# Patient Record
Sex: FEMALE | Race: WHITE | NOT HISPANIC OR LATINO | URBAN - METROPOLITAN AREA
[De-identification: names, ages, dates, MRNs, and addresses within clinical notes are randomized per-mention and may not be internally consistent; named-entity substitution may affect disease eponyms.]

---

## 2023-11-22 ENCOUNTER — OUTPATIENT (OUTPATIENT)
Dept: OUTPATIENT SERVICES | Facility: HOSPITAL | Age: 83
LOS: 1 days | End: 2023-11-22
Payer: MEDICARE

## 2023-11-22 VITALS
WEIGHT: 184.09 LBS | DIASTOLIC BLOOD PRESSURE: 75 MMHG | TEMPERATURE: 98 F | RESPIRATION RATE: 18 BRPM | OXYGEN SATURATION: 97 % | HEART RATE: 80 BPM | HEIGHT: 64.96 IN | SYSTOLIC BLOOD PRESSURE: 150 MMHG

## 2023-11-22 VITALS
SYSTOLIC BLOOD PRESSURE: 142 MMHG | DIASTOLIC BLOOD PRESSURE: 86 MMHG | RESPIRATION RATE: 19 BRPM | OXYGEN SATURATION: 100 % | HEART RATE: 78 BPM

## 2023-11-22 DIAGNOSIS — D64.9 ANEMIA, UNSPECIFIED: ICD-10-CM

## 2023-11-22 DIAGNOSIS — Z98.890 OTHER SPECIFIED POSTPROCEDURAL STATES: Chronic | ICD-10-CM

## 2023-11-22 PROCEDURE — 88305 TISSUE EXAM BY PATHOLOGIST: CPT | Mod: 26

## 2023-11-22 PROCEDURE — 45380 COLONOSCOPY AND BIOPSY: CPT

## 2023-11-22 PROCEDURE — 88312 SPECIAL STAINS GROUP 1: CPT | Mod: 26

## 2023-11-22 PROCEDURE — 88312 SPECIAL STAINS GROUP 1: CPT

## 2023-11-22 PROCEDURE — 88305 TISSUE EXAM BY PATHOLOGIST: CPT

## 2023-11-22 PROCEDURE — 43239 EGD BIOPSY SINGLE/MULTIPLE: CPT

## 2023-11-22 RX ORDER — SODIUM CHLORIDE 9 MG/ML
500 INJECTION INTRAMUSCULAR; INTRAVENOUS; SUBCUTANEOUS
Refills: 0 | Status: COMPLETED | OUTPATIENT
Start: 2023-11-22 | End: 2023-11-22

## 2023-11-22 RX ADMIN — SODIUM CHLORIDE 30 MILLILITER(S): 9 INJECTION INTRAMUSCULAR; INTRAVENOUS; SUBCUTANEOUS at 09:07

## 2023-11-22 NOTE — ASU DISCHARGE PLAN (ADULT/PEDIATRIC) - NS MD DC FALL RISK RISK
For information on Fall & Injury Prevention, visit: [email or web address] OR  [email or web address] OR  https://www.cdc.gov/steadi/patient.html

## 2023-11-27 LAB
SURGICAL PATHOLOGY STUDY: SIGNIFICANT CHANGE UP
SURGICAL PATHOLOGY STUDY: SIGNIFICANT CHANGE UP

## 2025-02-08 ENCOUNTER — INPATIENT (INPATIENT)
Facility: HOSPITAL | Age: 85
LOS: 3 days | Discharge: HOME | DRG: 840 | End: 2025-02-12
Attending: INTERNAL MEDICINE | Admitting: INTERNAL MEDICINE
Payer: MEDICARE

## 2025-02-08 VITALS
SYSTOLIC BLOOD PRESSURE: 133 MMHG | WEIGHT: 198.42 LBS | HEART RATE: 78 BPM | DIASTOLIC BLOOD PRESSURE: 87 MMHG | OXYGEN SATURATION: 95 % | RESPIRATION RATE: 16 BRPM | HEIGHT: 65 IN | TEMPERATURE: 98 F

## 2025-02-08 DIAGNOSIS — I50.9 HEART FAILURE, UNSPECIFIED: ICD-10-CM

## 2025-02-08 DIAGNOSIS — Z98.890 OTHER SPECIFIED POSTPROCEDURAL STATES: Chronic | ICD-10-CM

## 2025-02-08 PROBLEM — F32.9 MAJOR DEPRESSIVE DISORDER, SINGLE EPISODE, UNSPECIFIED: Chronic | Status: ACTIVE | Noted: 2023-11-22

## 2025-02-08 PROBLEM — E03.9 HYPOTHYROIDISM, UNSPECIFIED: Chronic | Status: ACTIVE | Noted: 2023-11-22

## 2025-02-08 LAB
ALBUMIN SERPL ELPH-MCNC: 2.7 G/DL — LOW (ref 3.5–5)
ALP SERPL-CCNC: 108 U/L — SIGNIFICANT CHANGE UP (ref 40–120)
ALT FLD-CCNC: 11 U/L DA — SIGNIFICANT CHANGE UP (ref 10–60)
ANION GAP SERPL CALC-SCNC: 3 MMOL/L — LOW (ref 5–17)
APPEARANCE UR: CLEAR — SIGNIFICANT CHANGE UP
AST SERPL-CCNC: 11 U/L — SIGNIFICANT CHANGE UP (ref 10–40)
BACTERIA # UR AUTO: ABNORMAL /HPF
BASOPHILS # BLD AUTO: 0.02 K/UL — SIGNIFICANT CHANGE UP (ref 0–0.2)
BASOPHILS NFR BLD AUTO: 0.7 % — SIGNIFICANT CHANGE UP (ref 0–2)
BILIRUB SERPL-MCNC: 0.4 MG/DL — SIGNIFICANT CHANGE UP (ref 0.2–1.2)
BILIRUB UR-MCNC: NEGATIVE — SIGNIFICANT CHANGE UP
BUN SERPL-MCNC: 8 MG/DL — SIGNIFICANT CHANGE UP (ref 7–18)
CALCIUM SERPL-MCNC: 9.2 MG/DL — SIGNIFICANT CHANGE UP (ref 8.4–10.5)
CHLORIDE SERPL-SCNC: 108 MMOL/L — SIGNIFICANT CHANGE UP (ref 96–108)
CO2 SERPL-SCNC: 25 MMOL/L — SIGNIFICANT CHANGE UP (ref 22–31)
COLOR SPEC: YELLOW — SIGNIFICANT CHANGE UP
CREAT SERPL-MCNC: 0.74 MG/DL — SIGNIFICANT CHANGE UP (ref 0.5–1.3)
DIFF PNL FLD: NEGATIVE — SIGNIFICANT CHANGE UP
EGFR: 81 ML/MIN/1.73M2 — SIGNIFICANT CHANGE UP
EOSINOPHIL # BLD AUTO: 0.1 K/UL — SIGNIFICANT CHANGE UP (ref 0–0.5)
EOSINOPHIL NFR BLD AUTO: 3.7 % — SIGNIFICANT CHANGE UP (ref 0–6)
EPI CELLS # UR: PRESENT
FLUAV AG NPH QL: SIGNIFICANT CHANGE UP
FLUBV AG NPH QL: SIGNIFICANT CHANGE UP
GLUCOSE SERPL-MCNC: 135 MG/DL — HIGH (ref 70–99)
GLUCOSE UR QL: NEGATIVE MG/DL — SIGNIFICANT CHANGE UP
HCT VFR BLD CALC: 27.8 % — LOW (ref 34.5–45)
HGB BLD-MCNC: 8 G/DL — LOW (ref 11.5–15.5)
IMM GRANULOCYTES NFR BLD AUTO: 1.1 % — HIGH (ref 0–0.9)
KETONES UR-MCNC: NEGATIVE MG/DL — SIGNIFICANT CHANGE UP
LEUKOCYTE ESTERASE UR-ACNC: ABNORMAL
LYMPHOCYTES # BLD AUTO: 0.64 K/UL — LOW (ref 1–3.3)
LYMPHOCYTES # BLD AUTO: 23.7 % — SIGNIFICANT CHANGE UP (ref 13–44)
MCHC RBC-ENTMCNC: 24.9 PG — LOW (ref 27–34)
MCHC RBC-ENTMCNC: 28.8 G/DL — LOW (ref 32–36)
MCV RBC AUTO: 86.6 FL — SIGNIFICANT CHANGE UP (ref 80–100)
MONOCYTES # BLD AUTO: 0.42 K/UL — SIGNIFICANT CHANGE UP (ref 0–0.9)
MONOCYTES NFR BLD AUTO: 15.6 % — HIGH (ref 2–14)
NEUTROPHILS # BLD AUTO: 1.49 K/UL — LOW (ref 1.8–7.4)
NEUTROPHILS NFR BLD AUTO: 55.2 % — SIGNIFICANT CHANGE UP (ref 43–77)
NITRITE UR-MCNC: NEGATIVE — SIGNIFICANT CHANGE UP
NRBC # BLD: 0 /100 WBCS — SIGNIFICANT CHANGE UP (ref 0–0)
NRBC BLD-RTO: 0 /100 WBCS — SIGNIFICANT CHANGE UP (ref 0–0)
NT-PROBNP SERPL-SCNC: 659 PG/ML — HIGH (ref 0–450)
PH UR: 6.5 — SIGNIFICANT CHANGE UP (ref 5–8)
PLATELET # BLD AUTO: 200 K/UL — SIGNIFICANT CHANGE UP (ref 150–400)
POTASSIUM SERPL-MCNC: 3.7 MMOL/L — SIGNIFICANT CHANGE UP (ref 3.5–5.3)
POTASSIUM SERPL-SCNC: 3.7 MMOL/L — SIGNIFICANT CHANGE UP (ref 3.5–5.3)
PROT SERPL-MCNC: 8.4 G/DL — HIGH (ref 6–8.3)
PROT UR-MCNC: NEGATIVE MG/DL — SIGNIFICANT CHANGE UP
RBC # BLD: 3.21 M/UL — LOW (ref 3.8–5.2)
RBC # FLD: 19.1 % — HIGH (ref 10.3–14.5)
RBC CASTS # UR COMP ASSIST: 1 /HPF — SIGNIFICANT CHANGE UP (ref 0–4)
RSV RNA NPH QL NAA+NON-PROBE: SIGNIFICANT CHANGE UP
SARS-COV-2 RNA SPEC QL NAA+PROBE: SIGNIFICANT CHANGE UP
SODIUM SERPL-SCNC: 136 MMOL/L — SIGNIFICANT CHANGE UP (ref 135–145)
SP GR SPEC: 1.01 — SIGNIFICANT CHANGE UP (ref 1–1.03)
TROPONIN I, HIGH SENSITIVITY RESULT: 9.3 NG/L — SIGNIFICANT CHANGE UP
UROBILINOGEN FLD QL: 0.2 MG/DL — SIGNIFICANT CHANGE UP (ref 0.2–1)
WBC # BLD: 2.7 K/UL — LOW (ref 3.8–10.5)
WBC # FLD AUTO: 2.7 K/UL — LOW (ref 3.8–10.5)
WBC UR QL: 1 /HPF — SIGNIFICANT CHANGE UP (ref 0–5)

## 2025-02-08 PROCEDURE — 71045 X-RAY EXAM CHEST 1 VIEW: CPT | Mod: 26

## 2025-02-08 PROCEDURE — 71250 CT THORAX DX C-: CPT | Mod: 26

## 2025-02-08 PROCEDURE — 74176 CT ABD & PELVIS W/O CONTRAST: CPT | Mod: 26

## 2025-02-08 PROCEDURE — 99285 EMERGENCY DEPT VISIT HI MDM: CPT

## 2025-02-08 RX ORDER — POLYETHYLENE GLYCOL 3350 17 G/17G
17 POWDER, FOR SOLUTION ORAL DAILY
Refills: 0 | Status: DISCONTINUED | OUTPATIENT
Start: 2025-02-08 | End: 2025-02-12

## 2025-02-08 RX ORDER — MORPHINE SULFATE 60 MG/1
4 TABLET, FILM COATED, EXTENDED RELEASE ORAL EVERY 4 HOURS
Refills: 0 | Status: DISCONTINUED | OUTPATIENT
Start: 2025-02-08 | End: 2025-02-10

## 2025-02-08 RX ORDER — MORPHINE SULFATE 60 MG/1
2 TABLET, FILM COATED, EXTENDED RELEASE ORAL EVERY 4 HOURS
Refills: 0 | Status: DISCONTINUED | OUTPATIENT
Start: 2025-02-08 | End: 2025-02-10

## 2025-02-08 RX ORDER — LIDOCAINE HYDROCHLORIDE 30 MG/G
1 CREAM TOPICAL ONCE
Refills: 0 | Status: COMPLETED | OUTPATIENT
Start: 2025-02-08 | End: 2025-02-08

## 2025-02-08 RX ORDER — NALOXONE HYDROCHLORIDE 3 MG/.1ML
0.4 SPRAY NASAL ONCE
Refills: 0 | Status: DISCONTINUED | OUTPATIENT
Start: 2025-02-08 | End: 2025-02-12

## 2025-02-08 RX ORDER — MORPHINE SULFATE 60 MG/1
4 TABLET, FILM COATED, EXTENDED RELEASE ORAL ONCE
Refills: 0 | Status: DISCONTINUED | OUTPATIENT
Start: 2025-02-08 | End: 2025-02-08

## 2025-02-08 RX ORDER — ACETAMINOPHEN 160 MG/5ML
650 SUSPENSION ORAL EVERY 6 HOURS
Refills: 0 | Status: DISCONTINUED | OUTPATIENT
Start: 2025-02-08 | End: 2025-02-12

## 2025-02-08 RX ORDER — BISACODYL 5 MG
5 TABLET, DELAYED RELEASE (ENTERIC COATED) ORAL DAILY
Refills: 0 | Status: DISCONTINUED | OUTPATIENT
Start: 2025-02-08 | End: 2025-02-12

## 2025-02-08 RX ORDER — SENNOSIDES 8.6 MG
2 TABLET ORAL AT BEDTIME
Refills: 0 | Status: DISCONTINUED | OUTPATIENT
Start: 2025-02-08 | End: 2025-02-12

## 2025-02-08 RX ADMIN — MORPHINE SULFATE 4 MILLIGRAM(S): 60 TABLET, FILM COATED, EXTENDED RELEASE ORAL at 23:20

## 2025-02-08 RX ADMIN — Medication 20 MILLIGRAM(S): at 18:49

## 2025-02-08 RX ADMIN — LIDOCAINE HYDROCHLORIDE 1 PATCH: 30 CREAM TOPICAL at 18:13

## 2025-02-08 RX ADMIN — MORPHINE SULFATE 4 MILLIGRAM(S): 60 TABLET, FILM COATED, EXTENDED RELEASE ORAL at 18:12

## 2025-02-08 NOTE — ED ADULT NURSE NOTE - OBJECTIVE STATEMENT
Patient reports falling in the kitchen while trying to  something from floor. felt dizzy and fell. Patient denies LOC, c/o right sided arm, side of breast, leg pain B/L lower ext swelling noted. Fall bundle activated and delegated to PCA david. Patient and daughter educated on fall prevention and related risks of injuries. patient and daughter verbalized understanding.

## 2025-02-08 NOTE — H&P ADULT - ATTENDING COMMENTS
T(C): 37.1 (02-08-25 @ 23:30), Max: 37.1 (02-08-25 @ 23:30)  HR: 73 (02-08-25 @ 23:30) (73 - 78)  BP: 122/60 (02-08-25 @ 23:30) (122/60 - 133/87)  RR: 17 (02-08-25 @ 23:30) (16 - 17)  SpO2: 95% (02-08-25 @ 23:30) (95% - 95%)    CT chest Abdomen shows lytic lesions involving the ribs, visualized spine, pelvic   bones, and proximal femurs, consistent with history of multiple myeloma.   Multiple subacute healing right rib fractures  Syncope   s/p Fall     Tele, CE   Follow up Echo   Cards eval   CT head     Multple Myeloma   Pain meds     Palliative Care    Pain meds   SW

## 2025-02-08 NOTE — ED ADULT TRIAGE NOTE - NS ED NURSE AMBULANCES
Called pt regarding below message. Informed pt that the [facility] does have a larger MRI machine. PT states [place] MRI department is being remodeled and the machine they are using is far too small. Pt states he will call back to schedule MRI in [facility]. Pt verbalized understanding with no further questions.     ----- Message from Marylu Kern sent at 8/14/2018  4:12 PM CDT -----    Patient   Is calling  About   The    MRI  That  Was  Booked the  Pt   This  Morning // pt  Reporting  The  MRI was  Too  Small   And  Pt  Was  Not able to take  Test // please call pt  At [phone]   Pt    Wants to discuss   Using  A another    MRI machine  For  His  Size //     
Called pt regarding below message. Left voicemail with return number.     ----- Message from Margo Gambino sent at 8/14/2018  7:28 AM CDT -----  Type: Needs Medical Advice    Who Called:  Patient  Best Call Back Number: [phone]  Additional Information: Patient went to have MRI done this morning at [facility]/machine was too small and told to request MRI be scheduled at [facility] instead/please call patient back to advise.    
[facility]

## 2025-02-08 NOTE — H&P ADULT - NSICDXPASTMEDICALHX_GEN_ALL_CORE_FT
PAST MEDICAL HISTORY:  Depression, reactive     HTN (hypertension)     Hypothyroid     Multiple myeloma

## 2025-02-08 NOTE — H&P ADULT - PROBLEM SELECTOR PLAN 1
Presenting after syncopal episode and with complaints of worsening bilateral lower extremity swelling.  trop neg.  - likely vasovagal iso intractable skeletal pain 2/2 progression of multiple myeloma. However, will need to r/o orthostatics and cardiac etiology. Low suspicion for seizures.  - EKG.  - TTE.  - PT eval.

## 2025-02-08 NOTE — H&P ADULT - PROBLEM SELECTOR PLAN 2
Follows with Heme onc Dr Colon ([facility]) outpatient. Completed course of Ninlaro ~6 wks ago. Currently on Revlimid.   CT C/A/P - expansile lytic lesions involving ribs, spine pelvic bones, proximal femurs. Multiple subacute healing R rib fractures. No acute pathologic fracture. Hypodensities in the left hepatic lobe and spleen. 1.2 cm RLL pulmonary nodule.     - concern for progression of multiple myeloma.   - pain control.  - Heme onc Dr Colon consult in the AM.   - Palliative consult in the AM.  - family would like more assistance at home given overall functional decline. Will obtain SW/CM consult. Follows with Heme onc Dr Colon ([facility]) outpatient. Completed course of Ninlaro ~6 wks ago. Currently on Revlimid.   CT C/A/P - expansile lytic lesions involving ribs, spine pelvic bones, proximal femurs. Multiple subacute healing R rib fractures. No acute pathologic fracture. Hypodensities in the left hepatic lobe and spleen. 1.2 cm RLL pulmonary nodule.   home meds: Completed course of Ninlaro ~6 wks ago. Currently on Revlimid and prednisone 20mg daily.    - c/w prednisone 20mg daily.  - concern for progression of multiple myeloma.   - pain control.  - Heme onc Dr Colon consult in the AM.   - Palliative consult in the AM.  - family would like more assistance at home given overall functional decline. Will obtain SW/CM consult. Follows with Heme onc Dr Colon ([facility]) outpatient. Completed course of Ninlaro ~6 wks ago. Currently on Revlimid.   CT C/A/P - expansile lytic lesions involving ribs, spine pelvic bones, proximal femurs. Multiple subacute healing R rib fractures. No acute pathologic fracture. Hypodensities in the left hepatic lobe and spleen. 1.2 cm RLL pulmonary nodule.   home meds: Completed course of Ninlaro ~6 wks ago. Currently on Revlimid and prednisone 20mg daily.    - c/w prednisone 20mg daily.  - concern for progression of multiple myeloma.   - pain control.  - Heme onc Dr Colon consulted.   - Palliative consult in the AM.  - family would like more assistance at home given overall functional decline. Will obtain SW/CM consult.

## 2025-02-08 NOTE — ED PROVIDER NOTE - CLINICAL SUMMARY MEDICAL DECISION MAKING FREE TEXT BOX
84-year-old female history of multiple myeloma, hypertension presents with her daughter for fall today.  Did not lose consciousness hit her head.  This is in the setting of worsening lower extremity edema and shortness of breath over the past month.  Has been having chronic bilateral axillary rib pain as well. Patient also recently noted to be neutropenic.  Denies any fevers, chills, substernal chest pain, nausea, vomiting.  Constipated no flatus or stool over the past 3 days despite bowel regimen.  No urinary symptoms.  No prolonged downtime only on the floor for about 5 minutes per daughter.  Arrives with nonactionable vital signs, clear cardiopulmonary exam, no midline spinal tenderness but bilateral rib tenderness and abdominal tenderness with distention.  Lower extremities bilaterally edematous but symmetrical and nontender.  Screen for worsening renal function, heart failure, ACS, rib fractures, obstruction. to be admitted.

## 2025-02-08 NOTE — H&P ADULT - NSHPREVIEWOFSYSTEMS_GEN_ALL_CORE
CONSTITUTIONAL: No fever, weight loss, or fatigue  RESPIRATORY: No cough, wheezing, chills, or hemoptysis; No shortness of breath  CARDIOVASCULAR: +leg swelling. No chest pain, palpitations, dizziness  GASTROINTESTINAL: No abdominal pain. No nausea, vomiting, or hematemesis. No diarrhea or constipation. No melena or hematochezia.  GENITOURINARY: No dysuria or hematuria, urinary frequency  NEUROLOGICAL: No headaches, memory loss, loss of strength, numbness, or tremors  ENDOCRINE: No polyuria, polydipsia, or heat/cold intolerance  MUSKULOSKELETAL: +bilateral rib pain.  HEME: no easy bruisability, no tender or enlarged lymph nodes  SKIN: No itching, burning, rashes, or lesions.

## 2025-02-08 NOTE — ED PROVIDER NOTE - NS ED ROS FT
Constitutional: no fevers, chills  HEENT: no HA, vision changes, rhinorrhea, sore throat  Cardiac: no chest pain, palpitations  Respiratory: +SOB, no cough or hemoptysis  GI: no n/v/d no abd pain, bloody or dark stools  : no dysuria, frequency, or hematuria  MSK: no joint pain, neck pain or back pain  Skin: no rashes, jaundice, pruritis  Neuro: no numbness/tingling, +gen weakness, unsteady gait  ROS otherwise neg except per MDM

## 2025-02-08 NOTE — H&P ADULT - HISTORY OF PRESENT ILLNESS
84F, from home, ambulates minimally with walker, PMH multiple myeloma, HTN, hypothyroidism. Presenting after syncopal episode and with complaints of worsening bilateral lower extremity swelling. Collateral obtained from daughter at bedside. Episode of syncope was unwitnessed. Patient endorses had bilateral rib pain prior to "passing out"; found herself on the floor. States she does not remember everything that happened prior to episode. Denies urinary or fecal incontinence, or tongue biting during episode. Managed to crawl up and call daughter who subsequently sent her to the ED. States has had worsening bilateral rib pain in the past couple of months. Taking tramadol PRN with minimal relief. Denies fever, chills, chest pain, palpitations, SOB, n/v/d, dysuria, numbness ro tingling of ext.     Follows with Heme onc Dr Colon ([facility]) outpatient. Completed course of Ninlaro ~6 wks ago. Currently on Revlimid.     As per daughter, patient's overall functional status has declined in the past 3 months - mostly bedbound, minimally ambulatory. States needs assistance at home.

## 2025-02-08 NOTE — H&P ADULT - NSHPPHYSICALEXAM_GEN_ALL_CORE
Vital Signs Last 24 Hrs  T(C): 37.1 (08 Feb 2025 23:30), Max: 37.1 (08 Feb 2025 23:30)  T(F): 98.8 (08 Feb 2025 23:30), Max: 98.8 (08 Feb 2025 23:30)  HR: 73 (08 Feb 2025 23:30) (73 - 78)  BP: 122/60 (08 Feb 2025 23:30) (122/60 - 133/87)  BP(mean): --  RR: 17 (08 Feb 2025 23:30) (16 - 17)  SpO2: 95% (08 Feb 2025 23:30) (95% - 95%)    Parameters below as of 08 Feb 2025 23:30  Patient On (Oxygen Delivery Method): room air    GENERAL: NAD  HEAD:  Atraumatic, Normocephalic  EYES: EOMI, PERRLA, conjunctiva and sclera clear  ENMT: No tonsillar erythema, exudates, or enlargement; Moist mucous membranes  NECK: Supple, normal appearance, No JVD; Normal thyroid; Trachea midline  NERVOUS SYSTEM:  Alert & Oriented X3,  Motor Strength 4/5 B/L upper and lower extremities, sensation intact  CHEST/LUNG: Lungs clear to auscultation bilaterally, No rales, rhonchi, wheezing   HEART: Regular rate and rhythm; No murmurs, rubs, or gallops  ABDOMEN: Soft, Nontender, Nondistended; Bowel sounds present  EXTREMITIES:  2+ pitting edema b/l. 2+ Peripheral Pulses, No clubbing, cyanosis  LYMPH: No lymphadenopathy noted  SKIN: No rashes or lesions

## 2025-02-08 NOTE — ED ADULT TRIAGE NOTE - CHIEF COMPLAINT QUOTE
From bed moving to bathroom uses walker and fell no LOC c/o bilateral swelling of legs pain on side of both ribs with chronic dizziness

## 2025-02-08 NOTE — ED PROVIDER NOTE - PHYSICAL EXAMINATION
General: non-toxic, NAD  HEENT: NCAT, PERRL, no conjunctival pallor, dry mucus membranes   Cardiac: RRR, no murmurs, 2+ peripheral pulses  Resp: CTAB  Abdomen: soft, non-distended, bowel sounds present, no ttp, no rebound or guarding. no organomegaly  MSK/Extremities: no midline spinal ttp. no extremity ttp. axillary chest wall ttp without flail segments. bilateral 2+ peripheral edema, no calf tenderness, or leg size discrepancies  Skin: no rashes  Neuro: AAOx4, 5+motor, sensation grossly intact CN 2-12 intact  Psych: mood and affect appropriate

## 2025-02-08 NOTE — H&P ADULT - ASSESSMENT
84F, from home, ambulates minimally with walker, PMH multiple myeloma, HTN, hypothyroidism. Presenting after syncopal episode and with complaints of worsening bilateral lower extremity swelling. CT C/A/P showing expansile lytic lesions involving ribs, spine pelvic bones, proximal femurs. Multiple subacute healing R rib fractures. No acute pathologic fracture. Hypodensities in the left hepatic lobe and spleen. 1.2 cm RLL pulmonary nodule. Admitted for syncope workup and progression of multiple myeloma.

## 2025-02-09 DIAGNOSIS — I10 ESSENTIAL (PRIMARY) HYPERTENSION: ICD-10-CM

## 2025-02-09 DIAGNOSIS — R55 SYNCOPE AND COLLAPSE: ICD-10-CM

## 2025-02-09 DIAGNOSIS — C90.00 MULTIPLE MYELOMA NOT HAVING ACHIEVED REMISSION: ICD-10-CM

## 2025-02-09 DIAGNOSIS — E03.9 HYPOTHYROIDISM, UNSPECIFIED: ICD-10-CM

## 2025-02-09 DIAGNOSIS — Z29.9 ENCOUNTER FOR PROPHYLACTIC MEASURES, UNSPECIFIED: ICD-10-CM

## 2025-02-09 LAB
ALBUMIN SERPL ELPH-MCNC: 2.4 G/DL — LOW (ref 3.5–5)
ALP SERPL-CCNC: 87 U/L — SIGNIFICANT CHANGE UP (ref 40–120)
ALT FLD-CCNC: 10 U/L DA — SIGNIFICANT CHANGE UP (ref 10–60)
ANION GAP SERPL CALC-SCNC: 5 MMOL/L — SIGNIFICANT CHANGE UP (ref 5–17)
APTT BLD: 33.1 SEC — SIGNIFICANT CHANGE UP (ref 24.5–35.6)
AST SERPL-CCNC: 8 U/L — LOW (ref 10–40)
BASOPHILS # BLD AUTO: 0.02 K/UL — SIGNIFICANT CHANGE UP (ref 0–0.2)
BASOPHILS NFR BLD AUTO: 0.8 % — SIGNIFICANT CHANGE UP (ref 0–2)
BILIRUB SERPL-MCNC: 0.2 MG/DL — SIGNIFICANT CHANGE UP (ref 0.2–1.2)
BLD GP AB SCN SERPL QL: SIGNIFICANT CHANGE UP
BUN SERPL-MCNC: 8 MG/DL — SIGNIFICANT CHANGE UP (ref 7–18)
CALCIUM SERPL-MCNC: 9.1 MG/DL — SIGNIFICANT CHANGE UP (ref 8.4–10.5)
CHLORIDE SERPL-SCNC: 109 MMOL/L — HIGH (ref 96–108)
CO2 SERPL-SCNC: 28 MMOL/L — SIGNIFICANT CHANGE UP (ref 22–31)
CREAT SERPL-MCNC: 0.82 MG/DL — SIGNIFICANT CHANGE UP (ref 0.5–1.3)
EGFR: 71 ML/MIN/1.73M2 — SIGNIFICANT CHANGE UP
EOSINOPHIL # BLD AUTO: 0.21 K/UL — SIGNIFICANT CHANGE UP (ref 0–0.5)
EOSINOPHIL NFR BLD AUTO: 8.3 % — HIGH (ref 0–6)
GLUCOSE SERPL-MCNC: 123 MG/DL — HIGH (ref 70–99)
HCT VFR BLD CALC: 24.9 % — LOW (ref 34.5–45)
HCT VFR BLD CALC: 28 % — LOW (ref 34.5–45)
HGB BLD-MCNC: 7.1 G/DL — LOW (ref 11.5–15.5)
HGB BLD-MCNC: 7.9 G/DL — LOW (ref 11.5–15.5)
IMM GRANULOCYTES NFR BLD AUTO: 0.8 % — SIGNIFICANT CHANGE UP (ref 0–0.9)
INR BLD: 1.19 RATIO — HIGH (ref 0.85–1.16)
LYMPHOCYTES # BLD AUTO: 1 K/UL — SIGNIFICANT CHANGE UP (ref 1–3.3)
LYMPHOCYTES # BLD AUTO: 39.7 % — SIGNIFICANT CHANGE UP (ref 13–44)
MAGNESIUM SERPL-MCNC: 2 MG/DL — SIGNIFICANT CHANGE UP (ref 1.6–2.6)
MCHC RBC-ENTMCNC: 24.5 PG — LOW (ref 27–34)
MCHC RBC-ENTMCNC: 24.5 PG — LOW (ref 27–34)
MCHC RBC-ENTMCNC: 28.2 G/DL — LOW (ref 32–36)
MCHC RBC-ENTMCNC: 28.5 G/DL — LOW (ref 32–36)
MCV RBC AUTO: 85.9 FL — SIGNIFICANT CHANGE UP (ref 80–100)
MCV RBC AUTO: 86.7 FL — SIGNIFICANT CHANGE UP (ref 80–100)
MONOCYTES # BLD AUTO: 0.52 K/UL — SIGNIFICANT CHANGE UP (ref 0–0.9)
MONOCYTES NFR BLD AUTO: 20.6 % — HIGH (ref 2–14)
NEUTROPHILS # BLD AUTO: 0.75 K/UL — LOW (ref 1.8–7.4)
NEUTROPHILS NFR BLD AUTO: 29.8 % — LOW (ref 43–77)
NRBC # BLD: 0 /100 WBCS — SIGNIFICANT CHANGE UP (ref 0–0)
NRBC # BLD: 0 /100 WBCS — SIGNIFICANT CHANGE UP (ref 0–0)
NRBC BLD-RTO: 0 /100 WBCS — SIGNIFICANT CHANGE UP (ref 0–0)
NRBC BLD-RTO: 0 /100 WBCS — SIGNIFICANT CHANGE UP (ref 0–0)
PHOSPHATE SERPL-MCNC: 4.6 MG/DL — HIGH (ref 2.5–4.5)
PLATELET # BLD AUTO: 166 K/UL — SIGNIFICANT CHANGE UP (ref 150–400)
PLATELET # BLD AUTO: 188 K/UL — SIGNIFICANT CHANGE UP (ref 150–400)
POTASSIUM SERPL-MCNC: 3.2 MMOL/L — LOW (ref 3.5–5.3)
POTASSIUM SERPL-SCNC: 3.2 MMOL/L — LOW (ref 3.5–5.3)
PROT SERPL-MCNC: 7.3 G/DL — SIGNIFICANT CHANGE UP (ref 6–8.3)
PROTHROM AB SERPL-ACNC: 13.8 SEC — HIGH (ref 9.9–13.4)
RBC # BLD: 2.9 M/UL — LOW (ref 3.8–5.2)
RBC # BLD: 3.23 M/UL — LOW (ref 3.8–5.2)
RBC # FLD: 19 % — HIGH (ref 10.3–14.5)
RBC # FLD: 19 % — HIGH (ref 10.3–14.5)
SODIUM SERPL-SCNC: 142 MMOL/L — SIGNIFICANT CHANGE UP (ref 135–145)
TSH SERPL-MCNC: 1.63 UU/ML — SIGNIFICANT CHANGE UP (ref 0.34–4.82)
WBC # BLD: 1.89 K/UL — LOW (ref 3.8–10.5)
WBC # BLD: 2.52 K/UL — LOW (ref 3.8–10.5)
WBC # FLD AUTO: 1.89 K/UL — LOW (ref 3.8–10.5)
WBC # FLD AUTO: 2.52 K/UL — LOW (ref 3.8–10.5)

## 2025-02-09 PROCEDURE — 70450 CT HEAD/BRAIN W/O DYE: CPT | Mod: 26

## 2025-02-09 RX ORDER — AMLODIPINE BESYLATE 5 MG
5 TABLET ORAL DAILY
Refills: 0 | Status: DISCONTINUED | OUTPATIENT
Start: 2025-02-09 | End: 2025-02-12

## 2025-02-09 RX ORDER — LOSARTAN POTASSIUM 100 MG
50 TABLET ORAL DAILY
Refills: 0 | Status: DISCONTINUED | OUTPATIENT
Start: 2025-02-09 | End: 2025-02-12

## 2025-02-09 RX ORDER — PANTOPRAZOLE 20 MG/1
40 TABLET, DELAYED RELEASE ORAL
Refills: 0 | Status: DISCONTINUED | OUTPATIENT
Start: 2025-02-09 | End: 2025-02-12

## 2025-02-09 RX ORDER — ENOXAPARIN SODIUM 100 MG/ML
40 INJECTION SUBCUTANEOUS EVERY 24 HOURS
Refills: 0 | Status: DISCONTINUED | OUTPATIENT
Start: 2025-02-09 | End: 2025-02-11

## 2025-02-09 RX ORDER — LEVOTHYROXINE SODIUM 25 UG/1
88 TABLET ORAL DAILY
Refills: 0 | Status: DISCONTINUED | OUTPATIENT
Start: 2025-02-09 | End: 2025-02-12

## 2025-02-09 RX ORDER — POTASSIUM CHLORIDE 750 MG/1
40 TABLET, EXTENDED RELEASE ORAL ONCE
Refills: 0 | Status: COMPLETED | OUTPATIENT
Start: 2025-02-09 | End: 2025-02-09

## 2025-02-09 RX ORDER — PREDNISONE 5 MG/1
20 TABLET ORAL DAILY
Refills: 0 | Status: DISCONTINUED | OUTPATIENT
Start: 2025-02-09 | End: 2025-02-10

## 2025-02-09 RX ADMIN — LEVOTHYROXINE SODIUM 88 MICROGRAM(S): 25 TABLET ORAL at 06:49

## 2025-02-09 RX ADMIN — LIDOCAINE HYDROCHLORIDE 1 PATCH: 30 CREAM TOPICAL at 07:21

## 2025-02-09 RX ADMIN — MORPHINE SULFATE 4 MILLIGRAM(S): 60 TABLET, FILM COATED, EXTENDED RELEASE ORAL at 02:09

## 2025-02-09 RX ADMIN — ACETAMINOPHEN 650 MILLIGRAM(S): 160 SUSPENSION ORAL at 18:54

## 2025-02-09 RX ADMIN — ENOXAPARIN SODIUM 40 MILLIGRAM(S): 100 INJECTION SUBCUTANEOUS at 06:49

## 2025-02-09 RX ADMIN — Medication 5 MILLIGRAM(S): at 06:46

## 2025-02-09 RX ADMIN — PANTOPRAZOLE 40 MILLIGRAM(S): 20 TABLET, DELAYED RELEASE ORAL at 06:46

## 2025-02-09 RX ADMIN — POLYETHYLENE GLYCOL 3350 17 GRAM(S): 17 POWDER, FOR SOLUTION ORAL at 11:23

## 2025-02-09 RX ADMIN — PREDNISONE 20 MILLIGRAM(S): 5 TABLET ORAL at 06:47

## 2025-02-09 RX ADMIN — Medication 50 MILLIGRAM(S): at 06:46

## 2025-02-09 RX ADMIN — POTASSIUM CHLORIDE 40 MILLIEQUIVALENT(S): 750 TABLET, EXTENDED RELEASE ORAL at 13:15

## 2025-02-09 NOTE — PATIENT PROFILE ADULT - FALL HARM RISK - HARM RISK INTERVENTIONS

## 2025-02-10 DIAGNOSIS — E43 UNSPECIFIED SEVERE PROTEIN-CALORIE MALNUTRITION: ICD-10-CM

## 2025-02-10 DIAGNOSIS — R53.81 OTHER MALAISE: ICD-10-CM

## 2025-02-10 DIAGNOSIS — Z51.5 ENCOUNTER FOR PALLIATIVE CARE: ICD-10-CM

## 2025-02-10 DIAGNOSIS — G89.3 NEOPLASM RELATED PAIN (ACUTE) (CHRONIC): ICD-10-CM

## 2025-02-10 DIAGNOSIS — K59.00 CONSTIPATION, UNSPECIFIED: ICD-10-CM

## 2025-02-10 LAB
ALBUMIN SERPL ELPH-MCNC: 2.7 G/DL — LOW (ref 3.5–5)
ALP SERPL-CCNC: 96 U/L — SIGNIFICANT CHANGE UP (ref 40–120)
ALT FLD-CCNC: 13 U/L DA — SIGNIFICANT CHANGE UP (ref 10–60)
ANION GAP SERPL CALC-SCNC: 6 MMOL/L — SIGNIFICANT CHANGE UP (ref 5–17)
AST SERPL-CCNC: 10 U/L — SIGNIFICANT CHANGE UP (ref 10–40)
BILIRUB SERPL-MCNC: 0.2 MG/DL — SIGNIFICANT CHANGE UP (ref 0.2–1.2)
BUN SERPL-MCNC: 12 MG/DL — SIGNIFICANT CHANGE UP (ref 7–18)
CALCIUM SERPL-MCNC: 8.7 MG/DL — SIGNIFICANT CHANGE UP (ref 8.4–10.5)
CHLORIDE SERPL-SCNC: 110 MMOL/L — HIGH (ref 96–108)
CO2 SERPL-SCNC: 23 MMOL/L — SIGNIFICANT CHANGE UP (ref 22–31)
CREAT SERPL-MCNC: 0.94 MG/DL — SIGNIFICANT CHANGE UP (ref 0.5–1.3)
EGFR: 61 ML/MIN/1.73M2 — SIGNIFICANT CHANGE UP
GLUCOSE SERPL-MCNC: 171 MG/DL — HIGH (ref 70–99)
HCT VFR BLD CALC: 26.9 % — LOW (ref 34.5–45)
HGB BLD-MCNC: 7.7 G/DL — LOW (ref 11.5–15.5)
MAGNESIUM SERPL-MCNC: 1.9 MG/DL — SIGNIFICANT CHANGE UP (ref 1.6–2.6)
MCHC RBC-ENTMCNC: 24.8 PG — LOW (ref 27–34)
MCHC RBC-ENTMCNC: 28.6 G/DL — LOW (ref 32–36)
MCV RBC AUTO: 86.8 FL — SIGNIFICANT CHANGE UP (ref 80–100)
NRBC # BLD: 0 /100 WBCS — SIGNIFICANT CHANGE UP (ref 0–0)
NRBC BLD-RTO: 0 /100 WBCS — SIGNIFICANT CHANGE UP (ref 0–0)
PHOSPHATE SERPL-MCNC: 3 MG/DL — SIGNIFICANT CHANGE UP (ref 2.5–4.5)
PLATELET # BLD AUTO: 203 K/UL — SIGNIFICANT CHANGE UP (ref 150–400)
POTASSIUM SERPL-MCNC: 4.1 MMOL/L — SIGNIFICANT CHANGE UP (ref 3.5–5.3)
POTASSIUM SERPL-SCNC: 4.1 MMOL/L — SIGNIFICANT CHANGE UP (ref 3.5–5.3)
PROT SERPL-MCNC: 8 G/DL — SIGNIFICANT CHANGE UP (ref 6–8.3)
RBC # BLD: 3.1 M/UL — LOW (ref 3.8–5.2)
RBC # FLD: 19.5 % — HIGH (ref 10.3–14.5)
SODIUM SERPL-SCNC: 139 MMOL/L — SIGNIFICANT CHANGE UP (ref 135–145)
WBC # BLD: 1.91 K/UL — LOW (ref 3.8–10.5)
WBC # FLD AUTO: 1.91 K/UL — LOW (ref 3.8–10.5)

## 2025-02-10 PROCEDURE — 99223 1ST HOSP IP/OBS HIGH 75: CPT

## 2025-02-10 PROCEDURE — 99497 ADVNCD CARE PLAN 30 MIN: CPT | Mod: 25

## 2025-02-10 PROCEDURE — 71275 CT ANGIOGRAPHY CHEST: CPT | Mod: 26

## 2025-02-10 PROCEDURE — 93970 EXTREMITY STUDY: CPT | Mod: 26

## 2025-02-10 RX ORDER — LIDOCAINE HYDROCHLORIDE 30 MG/G
1 CREAM TOPICAL DAILY
Refills: 0 | Status: DISCONTINUED | OUTPATIENT
Start: 2025-02-10 | End: 2025-02-12

## 2025-02-10 RX ORDER — OXYCODONE HYDROCHLORIDE 30 MG/1
10 TABLET ORAL EVERY 12 HOURS
Refills: 0 | Status: DISCONTINUED | OUTPATIENT
Start: 2025-02-10 | End: 2025-02-12

## 2025-02-10 RX ORDER — OXYCODONE HYDROCHLORIDE 30 MG/1
5 TABLET ORAL EVERY 6 HOURS
Refills: 0 | Status: DISCONTINUED | OUTPATIENT
Start: 2025-02-10 | End: 2025-02-11

## 2025-02-10 RX ORDER — DEXAMETHASONE SODIUM PHOSPHATE 4 MG/ML
2 INJECTION, SOLUTION INTRA-ARTICULAR; INTRALESIONAL; INTRAMUSCULAR; INTRAVENOUS; SOFT TISSUE EVERY 12 HOURS
Refills: 0 | Status: DISCONTINUED | OUTPATIENT
Start: 2025-02-10 | End: 2025-02-12

## 2025-02-10 RX ADMIN — OXYCODONE HYDROCHLORIDE 10 MILLIGRAM(S): 30 TABLET ORAL at 19:03

## 2025-02-10 RX ADMIN — LEVOTHYROXINE SODIUM 88 MICROGRAM(S): 25 TABLET ORAL at 05:37

## 2025-02-10 RX ADMIN — PANTOPRAZOLE 40 MILLIGRAM(S): 20 TABLET, DELAYED RELEASE ORAL at 05:37

## 2025-02-10 RX ADMIN — Medication 5 MILLIGRAM(S): at 05:37

## 2025-02-10 RX ADMIN — ENOXAPARIN SODIUM 40 MILLIGRAM(S): 100 INJECTION SUBCUTANEOUS at 06:08

## 2025-02-10 RX ADMIN — Medication 50 MILLIGRAM(S): at 05:37

## 2025-02-10 RX ADMIN — DEXAMETHASONE SODIUM PHOSPHATE 2 MILLIGRAM(S): 4 INJECTION, SOLUTION INTRA-ARTICULAR; INTRALESIONAL; INTRAMUSCULAR; INTRAVENOUS; SOFT TISSUE at 18:03

## 2025-02-10 RX ADMIN — ACETAMINOPHEN 650 MILLIGRAM(S): 160 SUSPENSION ORAL at 05:37

## 2025-02-10 RX ADMIN — OXYCODONE HYDROCHLORIDE 10 MILLIGRAM(S): 30 TABLET ORAL at 18:03

## 2025-02-10 RX ADMIN — POLYETHYLENE GLYCOL 3350 17 GRAM(S): 17 POWDER, FOR SOLUTION ORAL at 11:47

## 2025-02-10 RX ADMIN — PREDNISONE 20 MILLIGRAM(S): 5 TABLET ORAL at 05:36

## 2025-02-10 RX ADMIN — Medication 2 TABLET(S): at 21:26

## 2025-02-10 NOTE — PHYSICAL THERAPY INITIAL EVALUATION ADULT - IMPAIRMENTS CONTRIBUTING IMPAIRED BED MOBILITY, REHAB EVAL
pt. notes increased dizziness and nausea when moving from sit to supine; while pulling herself up from supine-sitting at EOB and controlling her trunk from sit-supine, pt. has increased pain in bilateral ribcage/pain/decreased ROM/decreased strength

## 2025-02-10 NOTE — PHYSICAL THERAPY INITIAL EVALUATION ADULT - IMPAIRED TRANSFERS: SIT/STAND, REHAB EVAL
Pt. notes that mild dizziness and nausea was unchanged from sit-stand and stand-sit; no pain w/ sit-stand/stand-sit/decreased ROM/decreased strength

## 2025-02-10 NOTE — CONSULT NOTE ADULT - PROBLEM SELECTOR RECOMMENDATION 9
Pt reports pain to b/l ribs 8/10 was on Tramadol 100 mg Q6h ; was not effective    -start Decadron 2 mg PO q12  -start Oxycodone ER 10 mg po q12  -Start Oxycodone IR 5 mg po q6 prn for breakthrough pain   -D'c morphine IV  -Bowel regimen

## 2025-02-10 NOTE — PHYSICAL THERAPY INITIAL EVALUATION ADULT - DIAGNOSIS, PT EVAL
(ICF Model) Pt. present w/deficits in Body Structures/Function (Impairments), incl: Strength, Balance, ROM, Pain, leading to deficits in performing the below noted Activities (Limitations).

## 2025-02-10 NOTE — PHYSICAL THERAPY INITIAL EVALUATION ADULT - ADDITIONAL COMMENTS
-- pt. owns rollator and standard walker but rarely uses them  -- pt. does not own shower chair, grab bars, or raised toilet seat   -- pt. states that she will never ambulate in community on her own; always has daughter walk with her  -- pt. denies having any other falls within the past 6 months besides her recent one

## 2025-02-10 NOTE — PHYSICAL THERAPY INITIAL EVALUATION ADULT - IMPAIRMENTS CONTRIBUTING TO GAIT DEVIATIONS, PT EVAL
pt. notes no change in dizziness and nausea; mild pain in bilateral ribcage while walking; notes moderate exertion during both trials of gait/pain/decreased ROM/decreased strength

## 2025-02-10 NOTE — PROGRESS NOTE ADULT - SUBJECTIVE AND OBJECTIVE BOX
PGY-1 Progress Note discussed with attending    Varghese Rodríguez via Teams TILL 5:00 PM  PLEASE CONTACT ON CALL TEAM:  - On Call Team (Please refer to Grover) FROM 5:00 PM - 8:30PM  - Nightfloat Team FROM 8:30 -7:30 AM    INTERVAL HPI/OVERNIGHT EVENTS:   No acute overnight events reported. Patient examined at bedside in the AM. Continues to report back pain bilaterally and leg swelling. PT present at time of exam. Plan discussed with patient, states understanding. All questions answered    REVIEW OF SYSTEMS:  CONSTITUTIONAL: No fever, weight loss, or fatigue  RESPIRATORY: No cough, wheezing, chills or hemoptysis; No shortness of breath  CARDIOVASCULAR: Mild chest pain (reproducible), leg swelling. No palpitations or dizziness.  GASTROINTESTINAL: No abdominal pain. No nausea, vomiting, or hematemesis; No diarrhea or constipation. No melena or hematochezia.  GENITOURINARY: No dysuria or hematuria, urinary frequency  NEUROLOGICAL: Back pain. No headaches, memory loss, loss of strength, numbness, or tremors  SKIN: No itching, burning, rashes, or lesions     Vital Signs Last 24 Hrs  T(C): 37 (10 Feb 2025 08:02), Max: 37.2 (09 Feb 2025 23:04)  T(F): 98.6 (10 Feb 2025 08:02), Max: 99 (09 Feb 2025 23:04)  HR: 73 (10 Feb 2025 09:25) (65 - 73)  BP: 130/71 (10 Feb 2025 09:25) (112/57 - 146/55)  BP(mean): --  RR: 18 (10 Feb 2025 08:02) (18 - 18)  SpO2: 98% (10 Feb 2025 09:25) (94% - 98%)    Parameters below as of 10 Feb 2025 09:25  Patient On (Oxygen Delivery Method): room air        PHYSICAL EXAMINATION:  GENERAL: NAD, well built  HEAD:  Atraumatic, Normocephalic  EYES:  conjunctiva and sclera clear  NECK: Supple, No JVD, Normal thyroid  CHEST/LUNG: Clear to auscultation. Clear to percussion bilaterally; No rales, rhonchi, wheezing, or rubs  HEART: Regular rate and rhythm; No murmurs, rubs, or gallops  ABDOMEN: Soft, Nontender, Nondistended; Bowel sounds present  NERVOUS SYSTEM:  Alert & Oriented X3,    EXTREMITIES:  2+ pitting edema bilaterally to the knee  SKIN: warm dry                          7.9    1.89  )-----------( 188      ( 09 Feb 2025 12:55 )             28.0     02-09    142  |  109[H]  |  8   ----------------------------<  123[H]  3.2[L]   |  28  |  0.82    Ca    9.1      09 Feb 2025 05:40  Phos  4.6     02-09  Mg     2.0     02-09    TPro  7.3  /  Alb  2.4[L]  /  TBili  0.2  /  DBili  x   /  AST  8[L]  /  ALT  10  /  AlkPhos  87  02-09    LIVER FUNCTIONS - ( 09 Feb 2025 05:40 )  Alb: 2.4 g/dL / Pro: 7.3 g/dL / ALK PHOS: 87 U/L / ALT: 10 U/L DA / AST: 8 U/L / GGT: x               PT/INR - ( 09 Feb 2025 12:55 )   PT: 13.8 sec;   INR: 1.19 ratio         PTT - ( 09 Feb 2025 12:55 )  PTT:33.1 sec    CAPILLARY BLOOD GLUCOSE      RADIOLOGY & ADDITIONAL TESTS:    TTE as per report

## 2025-02-10 NOTE — PHYSICAL THERAPY INITIAL EVALUATION ADULT - LIVES WITH, PROFILE
lives in [address] w/ elevator access; [address] w/ railing on one side but patient could not remember which side; pt. states her family lives nearby/alone

## 2025-02-10 NOTE — CONSULT NOTE ADULT - PROBLEM SELECTOR RECOMMENDATION 6
84F, from home, ambulates minimally with walker, PMH multiple myeloma, HTN, hypothyroidism. Presenting after syncopal episode and with complaints of worsening bilateral lower extremity swelling. CT C/A/P showing expansile lytic lesions involving ribs, spine pelvic bones, proximal femurs. Multiple subacute healing R rib fractures. No acute pathologic fracture. Hypodensities in the left hepatic lobe and spleen. 1.2 cm RLL pulmonary nodule. Admitted for syncope workup and progression of multiple myeloma. Patient does not meet criteria for hospice at this time, but would be low threshold for hospice for the future.  JANAY drafted: DNR/DNI/no PEG.  Please see discussion noted above in GOC conversation

## 2025-02-10 NOTE — PHYSICAL THERAPY INITIAL EVALUATION ADULT - MANUAL MUSCLE TESTING RESULTS, REHAB EVAL
R/L Shoulders: 3-/5, R/L Elbows: 4/5, R/L Hands: 3+/5; R/L Hips: 3-/5, R/L Knees: 4-/5, R/L Ankles: 4/5/grossly assessed due to R/L Shoulders: 3-/5, R/L Elbows: 4/5, R/L Hands: 3+/5; R/L Hips: 3-/5, Hips 3-/5, R/L Knees: 4-/5, R/L Ankles: 4/5/grossly assessed due to

## 2025-02-10 NOTE — CONSULT NOTE ADULT - SUBJECTIVE AND OBJECTIVE BOX
[facility] Geriatric and Palliative Consult Service:  Carmen Thao DO: cell ([phone])  Alonzo Sharma MD: cell ([phone])  Adrián Jones NP: cell ([phone])   Jessica Fleischer-Black MD: cell ([phone])  Kevin Kelley LMSW: cell ([phone])       Can contact any Palliative Team member via Microsoft Teams for consults and questions      HPI:  84F, from home, ambulates minimally with walker, PMH multiple myeloma, HTN, hypothyroidism. Presenting after syncopal episode and with complaints of worsening bilateral lower extremity swelling. Collateral obtained from daughter at bedside. Episode of syncope was unwitnessed. Patient endorses had bilateral rib pain prior to "passing out"; found herself on the floor. States she does not remember everything that happened prior to episode. Denies urinary or fecal incontinence, or tongue biting during episode. Managed to crawl up and call daughter who subsequently sent her to the ED. States has had worsening bilateral rib pain in the past couple of months. Taking tramadol PRN with minimal relief. Denies fever, chills, chest pain, palpitations, SOB, n/v/d, dysuria, numbness ro tingling of ext.     Follows with Heme onc Dr Colon ([facility]) outpatient. Completed course of Ninlaro ~6 wks ago. Currently on Revlimid.     As per daughter, patient's overall functional status has declined in the past 3 months - mostly bedbound, minimally ambulatory. States needs assistance at home. (08 Feb 2025 23:10)      PAST MEDICAL & SURGICAL HISTORY:  Depression, reactive      Hypothyroid      Multiple myeloma      HTN (hypertension)      H/O ovarian cystectomy      S/P breast lumpectomy          SOCIAL HISTORY:    Admitted from:  home  (with HHA)           assisted living          [facility]       LTC   [ none ] Substance abuse, [ none ] Tobacco hx, [ none ] Alcohol hx, [ none ] Home Opioid Hx    FAMILY HISTORY:   unable to obtain from patient due to poor mentation, family unable to give information, see H&P for history  Baseline ADLs (prior to admission):    Allergies    No Known Allergies    Intolerances      Present Symptoms: Mild, Moderate, Severe  Pain:             Location -                               Aggravating factors -             Quality -             Radiation -             Timing-             Severity (0-10 scale):             Minimal acceptable level (0-10 scale):  Fatigue:  Nausea:  Lack of Appetite:   SOB:  Depression:  Anxiety:  Review of Systems: [All others negative or Unable to obtain due to poor mentation]    CPOT:    https://www.[facility].org/getattachment/[email or web address]/Critical-Care-Pain-Observation-Tool-(CPOT)  PAIN AD Score:   http://geriatrictoolkit.[email or web address]/painad.pdf (press ctrl +  left click to view)      MEDICATIONS  (STANDING):  amLODIPine   Tablet 5 milliGRAM(s) Oral daily  enoxaparin Injectable 40 milliGRAM(s) SubCutaneous every 24 hours  influenza  Vaccine (HIGH DOSE) 0.5 milliLiter(s) IntraMuscular once  levothyroxine 88 MICROGram(s) Oral daily  losartan 50 milliGRAM(s) Oral daily  naloxone Injectable 0.4 milliGRAM(s) IV Push once  pantoprazole    Tablet 40 milliGRAM(s) Oral before breakfast  polyethylene glycol 3350 17 Gram(s) Oral daily  predniSONE   Tablet 20 milliGRAM(s) Oral daily  senna 2 Tablet(s) Oral at bedtime    MEDICATIONS  (PRN):  acetaminophen     Tablet .. 650 milliGRAM(s) Oral every 6 hours PRN Temp greater or equal to 38C (100.4F), Mild Pain (1 - 3)  bisacodyl 5 milliGRAM(s) Oral daily PRN Constipation  morphine  - Injectable 2 milliGRAM(s) IV Push every 4 hours PRN Moderate Pain (4 - 6)  morphine  - Injectable 4 milliGRAM(s) IV Push every 4 hours PRN Severe Pain (7 - 10)      PHYSICAL EXAM:  Vital Signs Last 24 Hrs  T(C): 37 (10 Feb 2025 10:53), Max: 37.2 (09 Feb 2025 23:04)  T(F): 98.6 (10 Feb 2025 10:53), Max: 99 (09 Feb 2025 23:04)  HR: 69 (10 Feb 2025 10:53) (65 - 73)  BP: 125/54 (10 Feb 2025 10:53) (112/57 - 146/55)  BP(mean): --  RR: 18 (10 Feb 2025 10:53) (18 - 18)  SpO2: 97% (10 Feb 2025 10:53) (94% - 98%)    Parameters below as of 10 Feb 2025 10:53  Patient On (Oxygen Delivery Method): room air        General: alert  oriented x ____    lethargic distressed cachexia  nonverbal  unarousable verbal    Palliative Performance Scale/Karnofsky Score:  http://[facility].org/files/news/palliative_performance_scale_ppsv2.pdf    HEENT: no abnormal lesion, dry mouth  ET tube/trach oral lesions:  Lungs: tachypnea/labored breathing, audible excessive secretions  CV: RRR, S1S2, tachycardia  GI: soft non distended non tender  incontinent               PEG/NG/OG tube  constipation  last BM:   : incontinent  oliguria/anuria  price  Musculoskeletal: weakness x4 edema x4    ambulatory with assistance   mostly/fully bedbound/wheelchair bound  Skin: no abnormal skin lesions, poor skin turgor, pressure ulcer stage:   Neuro: no deficits, mild cognitive impairment dsyphagia/dysarthria paresis  Oral intake ability: unable/only mouth care, minimal moderate full capability    LABS:                        7.9    1.89  )-----------( 188      ( 09 Feb 2025 12:55 )             28.0     02-09    142  |  109[H]  |  8   ----------------------------<  123[H]  3.2[L]   |  28  |  0.82    Ca    9.1      09 Feb 2025 05:40  Phos  4.6     02-09  Mg     1.9     02-10    TPro  7.3  /  Alb  2.4[L]  /  TBili  0.2  /  DBili  x   /  AST  8[L]  /  ALT  10  /  AlkPhos  87  02-09    Urinalysis Basic - ( 09 Feb 2025 05:40 )    Color: x / Appearance: x / SG: x / pH: x  Gluc: 123 mg/dL / Ketone: x  / Bili: x / Urobili: x   Blood: x / Protein: x / Nitrite: x   Leuk Esterase: x / RBC: x / WBC x   Sq Epi: x / Non Sq Epi: x / Bacteria: x        RADIOLOGY & ADDITIONAL STUDIES:         [facility] Geriatric and Palliative Consult Service:  Carmen Thao DO: cell ([phone])  Alonzo Sharma MD: cell ([phone])  Adrián Jones NP: cell ([phone])   Jessica Fleischer-Black MD: cell ([phone])  Kevin Kelley LMSW: cell ([phone])       Can contact any Palliative Team member via Microsoft Teams for consults and questions      HPI:  84F, from home, ambulates minimally with walker, PMH multiple myeloma, HTN, hypothyroidism. Presenting after syncopal episode and with complaints of worsening bilateral lower extremity swelling. Collateral obtained from daughter at bedside. Episode of syncope was unwitnessed. Patient endorses had bilateral rib pain prior to "passing out"; found herself on the floor. States she does not remember everything that happened prior to episode. Denies urinary or fecal incontinence, or tongue biting during episode. Managed to crawl up and call daughter who subsequently sent her to the ED. States has had worsening bilateral rib pain in the past couple of months. Taking tramadol PRN with minimal relief. Denies fever, chills, chest pain, palpitations, SOB, n/v/d, dysuria, numbness ro tingling of ext.     Follows with Heme onc Dr Colon ([facility]) outpatient. Completed course of Ninlaro ~6 wks ago. Currently on Revlimid.     As per daughter, patient's overall functional status has declined in the past 3 months - mostly bedbound, minimally ambulatory. States needs assistance at home. (08 Feb 2025 23:10)    Interval hx: Pt seen and examined at the bedside, AOX3 reports pain 8/10 to b/l thoracic cage.  Daughter Yodit at the bedside    PAST MEDICAL & SURGICAL HISTORY:  Depression, reactive      Hypothyroid      Multiple myeloma      HTN (hypertension)      H/O ovarian cystectomy      S/P breast lumpectomy          SOCIAL HISTORY:    Admitted from:  home alone  Pt's daughter Capri is her assigned HCP  [ none ] Substance abuse, [ none ] Tobacco hx, [ none ] Alcohol hx, [ none ] Home Opioid Hx    Language: Cypriot/English    Capri Brissa  (dtr)    Phone# [phone]    FAMILY HISTORY:   unable to obtain from patient due to poor mentation, family unable to give information, see H&P for history  Baseline ADLs (prior to admission): minimally ambulatory required extensive assist with ADLs    Allergies    No Known Allergies    Intolerances      Present Symptoms: Mild, Moderate, Severe  Pain:             Location -    b/l rib cage                         Aggravating factors - movement             Quality -             Radiation -             Timing-             Severity (0-10 scale): 8/10             Minimal acceptable level (0-10 scale):  Fatigue: severe  Nausea: mild  Lack of Appetite: mild  SOB: denies  Depression:unassessed  Anxiety: unassessed  Review of Systems: [All others negative     CPOT:    https://www.[facility].org/getattachment/[email or web address]/Critical-Care-Pain-Observation-Tool-(CPOT)  PAIN AD Score:   http://geriatrictoolkit.[email or web address]/cog/painad.pdf (press ctrl +  left click to view)      MEDICATIONS  (STANDING):  amLODIPine   Tablet 5 milliGRAM(s) Oral daily  enoxaparin Injectable 40 milliGRAM(s) SubCutaneous every 24 hours  influenza  Vaccine (HIGH DOSE) 0.5 milliLiter(s) IntraMuscular once  levothyroxine 88 MICROGram(s) Oral daily  losartan 50 milliGRAM(s) Oral daily  naloxone Injectable 0.4 milliGRAM(s) IV Push once  pantoprazole    Tablet 40 milliGRAM(s) Oral before breakfast  polyethylene glycol 3350 17 Gram(s) Oral daily  predniSONE   Tablet 20 milliGRAM(s) Oral daily  senna 2 Tablet(s) Oral at bedtime    MEDICATIONS  (PRN):  acetaminophen     Tablet .. 650 milliGRAM(s) Oral every 6 hours PRN Temp greater or equal to 38C (100.4F), Mild Pain (1 - 3)  bisacodyl 5 milliGRAM(s) Oral daily PRN Constipation  morphine  - Injectable 2 milliGRAM(s) IV Push every 4 hours PRN Moderate Pain (4 - 6)  morphine  - Injectable 4 milliGRAM(s) IV Push every 4 hours PRN Severe Pain (7 - 10)      PHYSICAL EXAM:  Vital Signs Last 24 Hrs  T(C): 37 (10 Feb 2025 10:53), Max: 37.2 (09 Feb 2025 23:04)  T(F): 98.6 (10 Feb 2025 10:53), Max: 99 (09 Feb 2025 23:04)  HR: 69 (10 Feb 2025 10:53) (65 - 73)  BP: 125/54 (10 Feb 2025 10:53) (112/57 - 146/55)  BP(mean): --  RR: 18 (10 Feb 2025 10:53) (18 - 18)  SpO2: 97% (10 Feb 2025 10:53) (94% - 98%)    Parameters below as of 10 Feb 2025 10:53  Patient On (Oxygen Delivery Method): room air        General: Chronically ill appearing elderly woman, AOx3, c/o pain to b/l rib cage 8/20    Palliative Performance Scale/Karnofsky Score: 30%  http://[facility].org/files/news/palliative_performance_scale_ppsv2.pdf    HEENT: no abnormal lesion, moist mm, neck supple  Lungs: unlabored on RA  CV: RRR, S1S2  GI: soft non distended non tender on palpation              last BM: non documented  : urinating  Musculoskeletal: weakness x4, bedbound, b/l LE edema  Skin: no abnormal skin lesions, poor skin turgor  Neuro: no deficits, able to follow commands  Oral intake ability: ufull capability    LABS:                        7.9    1.89  )-----------( 188      ( 09 Feb 2025 12:55 )             28.0     02-09    142  |  109[H]  |  8   ----------------------------<  123[H]  3.2[L]   |  28  |  0.82    Ca    9.1      09 Feb 2025 05:40  Phos  4.6     02-09  Mg     1.9     02-10    TPro  7.3  /  Alb  2.4[L]  /  TBili  0.2  /  DBili  x   /  AST  8[L]  /  ALT  10  /  AlkPhos  87  02-09    Urinalysis Basic - ( 09 Feb 2025 05:40 )    Color: x / Appearance: x / SG: x / pH: x  Gluc: 123 mg/dL / Ketone: x  / Bili: x / Urobili: x   Blood: x / Protein: x / Nitrite: x   Leuk Esterase: x / RBC: x / WBC x   Sq Epi: x / Non Sq Epi: x / Bacteria: x  < from: CT Abdomen and Pelvis No Cont (02.08.25 @ 17:23) >  ACC: [id number] EXAM:  CT ABDOMEN AND PELVIS   ORDERED BY: IRAIDA LOPEZ     ACC: [id number] EXAM:  CT CHEST   ORDERED BY: IRAIDA LOPEZ     PROCEDURE DATE:  02/08/2025          INTERPRETATION:  CLINICAL INFORMATION: Fall. Shortness of breath.   Bilateral rib pain. Abdominal distention and constipation.    COMPARISON: None.    CONTRAST/COMPLICATIONS:  IV Contrast: NONE  Oral Contrast: NONE  .    PROCEDURE:  CT of the Chest, Abdomen and Pelvis was performed.  Sagittal and coronal reformats were performed.    FINDINGS:  CHEST:  LUNGS AND LARGE AIRWAYS: Patent central airways. 4 mm nodule in the left   upper lobe (12-26). 1.2 x 0.6 cm nodule in the right lower lobe (12-1   02). Bibasilar atelectasis.  PLEURA: Small bilateral pleural effusions.  VESSELS: Aortic calcifications.  HEART: Cardiomegaly. No pericardial effusion.  MEDIASTINUM AND JOEL: No lymphadenopathy.  CHEST WALL AND LOWER NECK: Multiple expansile lytic lesions involving   bilateral ribs. Multiple subacute healing right rib fractures. There is a   13.7 x 5.6 cm x 5.7 fat density mass with heterogeneous soft tissue   density and calcifications involving the left posterior shoulder (2-21).    ABDOMEN AND PELVIS:  LIVER: Indeterminate 1.3 cm hypodensity in the left hepatic lobe(2-1   46). Additional subcentimeter hypodensities too small to characterize.  BILE DUCTS: Normal caliber.  GALLBLADDER: Cholelithiasis.  SPLEEN: 1.9 cm indeterminate hypodensity in the spleen.  PANCREAS: Within normal limits.  ADRENALS: Within normal limits.  KIDNEYS/URETERS: No hydronephrosis or obstructive renal calculi.   Bilateral renal cortical and renal sinus cysts.    BLADDER: Within normal limits.  REPRODUCTIVE ORGANS: Fibroid uterus. No suspicious adnexal mass.    BOWEL: No bowel obstruction. Moderate hiatal hernia. The appendix is not   seen.  PERITONEUM/RETROPERITONEUM: Within normal limits.  VESSELS: Atherosclerotic changes.  LYMPH NODES: No lymphadenopathy.  ABDOMINAL WALL: Within normal limits.  BONES: Scattered expansile lyticlesions involving the visualized   thoracolumbar spine, pelvic bones, and proximal femurs.    IMPRESSION:  1.  Expansile lytic lesions involving the ribs, visualized spine, pelvic   bones, and proximal femurs, consistent with history of multiple myeloma.   Multiple subacute healing right rib fractures. No acute pathologic   fracture.    2.  No bowel obstruction.    3.  Indeterminate hypodensities in the left hepatic lobe and spleen.   Consider further evaluation with nonemergent MRI of the abdomen with and   without contrast.    4.  1.2 cm right lower lobe pulmonary nodule. Recommend comparison to   prior outside imaging. If no prior imaging, recommend CT chest in 3   months to evaluate for interval change.    5.  13.7 cm complex fat density mass with soft tissue density and   calcifications in the left posterior shoulder. An atypical lipomatous   tumor cannot be excluded. Recommend further evaluation with contrast   enhanced MRI to include the left shoulder and left chest wall.    < end of copied text >        RADIOLOGY & ADDITIONAL STUDIES: reviewed  ADVANCED DIRECTIVES: MOLST: DNR/DNI/no PEG

## 2025-02-10 NOTE — CONSULT NOTE ADULT - CONVERSATION DETAILS
Met with the pt and her daughter at the bedside.  Explained palliative care's role in symptom management, identification of goals of care, advance care planning, and support.  Pt's daughter reports she has been experiencing progressive functional decline minimally ambulatory with walker with decreased oral intake.  Pt endorsed she has increased weakness, fatigue and dizziness. Discussed finding on CT of POD. Pt stated she will continue with treatment.  Explained she remains risk for decompensation and decline given POD on treatment.   Discussed risks/benefits of LST such as CPR/ intubation, artificial nutrition/PEG in the context of advanced malignancy, w debility. MOLST drafted; DNR/DNI/no feeding tube.    Patient does not meet criteria for hospice at this time, but would be low threshold for hospice for the future.   All questions answered.  Support provided.  Palliative care will continue to follow and provide symptom management and support.   d/w primary team and hem/onc

## 2025-02-10 NOTE — PHYSICAL THERAPY INITIAL EVALUATION ADULT - LEVEL OF INDEPENDENCE: STAIR NEGOTIATION, REHAB EVAL
did not assess since patient noted feeling tired and did not want to continue; will assess at later time

## 2025-02-10 NOTE — CONSULT NOTE ADULT - SUBJECTIVE AND OBJECTIVE BOX
C A R D I O L O G Y  *********************    DATE OF SERVICE: 02-10-25    HISTORY OF PRESENT ILLNESS:    84F, from home, ambulates minimally with walker, PMH multiple myeloma, HTN, hypothyroidism. Presenting after syncopal episode and with complaints of worsening bilateral lower extremity swelling. Collateral obtained from daughter at bedside. Episode of syncope was unwitnessed. Patient endorses had bilateral rib pain prior to "passing out"; found herself on the floor. States she does not remember everything that happened prior to episode. Denies urinary or fecal incontinence, or tongue biting during episode. Managed to crawl up and call daughter who subsequently sent her to the ED. States has had worsening bilateral rib pain in the past couple of months. Taking tramadol PRN with minimal relief. Denies fever, chills, chest pain, palpitations, SOB, n/v/d, dysuria, numbness ro tingling of ext.   No Known CAD.  Cardiology is called for severe pulm HTN on echo    Follows with Heme onc Dr Colon ([facility]) outpatient. Completed course of Ninlaro ~6 wks ago. Currently on Revlimid.     As per daughter, patient's overall functional status has declined in the past 3 months - mostly bedbound, minimally ambulatory. States needs assistance at home. (08 Feb 2025 23:10)      PAST MEDICAL & SURGICAL HISTORY:  Depression, reactive  Hypothyroid  Multiple myeloma  HTN (hypertension  H/O ovarian cystectomy  S/P breast lumpectomy      MEDICATIONS:  MEDICATIONS  (STANDING):  amLODIPine   Tablet 5 milliGRAM(s) Oral daily  dexAMETHasone     Tablet 2 milliGRAM(s) Oral every 12 hours  enoxaparin Injectable 40 milliGRAM(s) SubCutaneous every 24 hours  influenza  Vaccine (HIGH DOSE) 0.5 milliLiter(s) IntraMuscular once  levothyroxine 88 MICROGram(s) Oral daily  lidocaine   4% Patch 1 Patch Transdermal daily  losartan 50 milliGRAM(s) Oral daily  naloxone Injectable 0.4 milliGRAM(s) IV Push once  oxyCODONE  ER Tablet 10 milliGRAM(s) Oral every 12 hours  pantoprazole    Tablet 40 milliGRAM(s) Oral before breakfast  polyethylene glycol 3350 17 Gram(s) Oral daily  senna 2 Tablet(s) Oral at bedtime      Allergies    No Known Allergies    Intolerances        FAMILY HISTORY:    Non-contributary for premature coronary disease or sudden cardiac death    SOCIAL HISTORY:    [X ] Non-smoker  [ ] Smoker  [ ] Alcohol        REVIEW OF SYSTEMS:  [ ]chest pain  [  ]shortness of breath  [  ]palpitations  [  ]syncope  [ ]near syncope [ ]upper extremity weakness   [ ] lower extremity weakness  [  ]diplopia  [  ]altered mental status   [  ]fevers  [ ]chills [ ]nausea  [ ]vomitting  [  ]dysphagia    [ ]abdominal pain  [ ]melena  [ ]BRBPR    [  ]epistaxis  [  ]rash    [ ]lower extremity edema        [X] All others negative	  [ ] Unable to obtain      LABS:	 	    CARDIAC MARKERS:        Troponin I, High Sensitivity Result: 9.3 ng/L (02-08-25 @ 17:36)                            7.7    1.91  )-----------( 203      ( 10 Feb 2025 10:00 )             26.9     Hb Trend: 7.7<--    02-10    139  |  110[H]  |  12  ----------------------------<  171[H]  4.1   |  23  |  0.94    Ca    8.7      10 Feb 2025 10:00  Phos  3.0     02-10  Mg     1.9     02-10    TPro  8.0  /  Alb  2.7[L]  /  TBili  0.2  /  DBili  x   /  AST  10  /  ALT  13  /  AlkPhos  96  02-10    Creatinine Trend: 0.94<--, 0.82<--, 0.74<--      PHYSICAL EXAM:  T(C): 37 (02-10-25 @ 10:53), Max: 37.2 (02-09-25 @ 23:04)  HR: 69 (02-10-25 @ 10:53) (66 - 73)  BP: 125/54 (02-10-25 @ 10:53) (112/57 - 146/55)  RR: 18 (02-10-25 @ 10:53) (18 - 18)  SpO2: 97% (02-10-25 @ 10:53) (94% - 98%)  Wt(kg): --   BMI (kg/m2): 33 (02-08-25 @ 16:02)  I&O's Summary    09 Feb 2025 07:01  -  10 Feb 2025 07:00  --------------------------------------------------------  IN: 290 mL / OUT: 0 mL / NET: 290 mL        Gen: Appears well in NAD  HEENT:  (-)icterus (-)pallor  CV: N S1 S2 1/6 AYDIN (+)2 Pulses B/l  Resp:  Clear to ausculatation B/L, normal effort  GI: (+) BS Soft, NT, ND  Lymph:  (+)Edema, (-)obvious lymphadenopathy  Skin: Warm to touch, Normal turgor  Psych: Appropriate mood and affect      ECG:  	NONE    RADIOLOGY:         CXR:   < from: Xray Chest 1 View- PORTABLE-Urgent (02.08.25 @ 17:38) >   Small bilateral pleural effusions. Cardiomegaly.    < end of copied text >      ASSESSMENT/PLAN: 	84y Female ambulates minimally with walker, PMH multiple myeloma, HTN, hypothyroidism. Presenting after syncopal episode and with complaints of worsening bilateral lower extremity swelling ? LOC    # Severe Pulm HTN  - CHeck CTPA r/o chronic thromboembolic disease  - Pulm eval     # ? Syncope  - check 12 lead EKG  - no pertinent finfdings on tele thus far  - d/w resident    # Multiple myeloma  - heme f/u    I once again thank you for allowing me to participate in the care of your patient.  If you have any questions or concerns please do not hesitate to contact me.    Lionel Zuleta MD, [facility]  BEEPER [phone]

## 2025-02-10 NOTE — PHYSICAL THERAPY INITIAL EVALUATION ADULT - PERTINENT HX OF CURRENT PROBLEM, REHAB EVAL
Pt. had fall at home after syncopal event. Has history of chronic myeloma and recently worsening bilateral LE edema. Additional past medical history as detailed below by medical team.

## 2025-02-10 NOTE — CONSULT NOTE ADULT - PROBLEM SELECTOR RECOMMENDATION 3
Hx of MM currently on Revlimid. Completed course of Ninlaro ~6 wks ago. W POD  Follows with Heme onc Dr Colon ([facility]) outpatient.   ECOG 3    Pt wishes to continue with treatment    DNR/DNI

## 2025-02-10 NOTE — CONSULT NOTE ADULT - PROBLEM SELECTOR RECOMMENDATION 2
Pt reports no BM past 5 days. + flatus.  No BM documented    -c/w Miralax  -c/w Bisacodyl  -c/w Senna  -Consider enema

## 2025-02-10 NOTE — PHYSICAL THERAPY INITIAL EVALUATION ADULT - GENERAL OBSERVATIONS, REHAB EVAL
Consult received, chart reviewed. Patient received supine in bed w/ daughter at bedside, NAD, +tele. Patient agreed to EVALUATION from Physical Therapist.

## 2025-02-10 NOTE — CONSULT NOTE ADULT - PROBLEM SELECTOR RECOMMENDATION 5
Pt reports increased weakness, fatigue minimally ambulatory.  Supportive care  Freq positioning    Pt eval noted

## 2025-02-10 NOTE — CONSULT NOTE ADULT - PROBLEM SELECTOR RECOMMENDATION 4
Clinical evidence indicates that the patient has Severe protein calorie malnutrition/ 3rd degree. Albumin 2.4 poor po intake prior to admission     In context of    Chronic Illness (>1 month)    Energy/Food intake <50% of estimated energy requirement >5 days  Weight loss: Moderate - severe (lbs lost recently)  Body Fat loss: Severe   (Cachexia, temporal wasting, muscle atrophy)  Muscle mass loss: Severe   Fluid Accumulation: Severe    Strength: weakened severe     Recommend:   pleasure feeds as tolerated - aspiration precautions, careful hand-feeding, teaching to caregivers  nutritional supplements as tolerated, nutrition consult    No PEG

## 2025-02-10 NOTE — PHYSICAL THERAPY INITIAL EVALUATION ADULT - ACTIVE RANGE OF MOTION EXAMINATION, REHAB EVAL
BLE ROM WNL except R/L Hips: ~70%/yuliana. upper extremity Active ROM was WNL (within normal limits) BLE ROM WNL except R/L Hips: ~70%/bilateral upper extremity Active ROM was WFL (within functional limits)

## 2025-02-11 DIAGNOSIS — I27.20 PULMONARY HYPERTENSION, UNSPECIFIED: ICD-10-CM

## 2025-02-11 DIAGNOSIS — I48.91 UNSPECIFIED ATRIAL FIBRILLATION: ICD-10-CM

## 2025-02-11 LAB
ANION GAP SERPL CALC-SCNC: 7 MMOL/L — SIGNIFICANT CHANGE UP (ref 5–17)
BUN SERPL-MCNC: 12 MG/DL — SIGNIFICANT CHANGE UP (ref 7–18)
CALCIUM SERPL-MCNC: 8.9 MG/DL — SIGNIFICANT CHANGE UP (ref 8.4–10.5)
CHLORIDE SERPL-SCNC: 110 MMOL/L — HIGH (ref 96–108)
CO2 SERPL-SCNC: 22 MMOL/L — SIGNIFICANT CHANGE UP (ref 22–31)
CREAT SERPL-MCNC: 0.79 MG/DL — SIGNIFICANT CHANGE UP (ref 0.5–1.3)
EGFR: 75 ML/MIN/1.73M2 — SIGNIFICANT CHANGE UP
FLUAV AG NPH QL: SIGNIFICANT CHANGE UP
FLUBV AG NPH QL: SIGNIFICANT CHANGE UP
GLUCOSE SERPL-MCNC: 127 MG/DL — HIGH (ref 70–99)
HCT VFR BLD CALC: 26.5 % — LOW (ref 34.5–45)
HGB BLD-MCNC: 7.5 G/DL — LOW (ref 11.5–15.5)
MAGNESIUM SERPL-MCNC: 2 MG/DL — SIGNIFICANT CHANGE UP (ref 1.6–2.6)
MCHC RBC-ENTMCNC: 24.6 PG — LOW (ref 27–34)
MCHC RBC-ENTMCNC: 28.3 G/DL — LOW (ref 32–36)
MCV RBC AUTO: 86.9 FL — SIGNIFICANT CHANGE UP (ref 80–100)
NRBC # BLD: 0 /100 WBCS — SIGNIFICANT CHANGE UP (ref 0–0)
NRBC BLD-RTO: 0 /100 WBCS — SIGNIFICANT CHANGE UP (ref 0–0)
PHOSPHATE SERPL-MCNC: 4.6 MG/DL — HIGH (ref 2.5–4.5)
PLATELET # BLD AUTO: 160 K/UL — SIGNIFICANT CHANGE UP (ref 150–400)
POTASSIUM SERPL-MCNC: 4.2 MMOL/L — SIGNIFICANT CHANGE UP (ref 3.5–5.3)
POTASSIUM SERPL-SCNC: 4.2 MMOL/L — SIGNIFICANT CHANGE UP (ref 3.5–5.3)
RBC # BLD: 3.05 M/UL — LOW (ref 3.8–5.2)
RBC # FLD: 19.5 % — HIGH (ref 10.3–14.5)
RSV RNA NPH QL NAA+NON-PROBE: SIGNIFICANT CHANGE UP
SARS-COV-2 RNA SPEC QL NAA+PROBE: SIGNIFICANT CHANGE UP
SODIUM SERPL-SCNC: 139 MMOL/L — SIGNIFICANT CHANGE UP (ref 135–145)
WBC # BLD: 1.84 K/UL — LOW (ref 3.8–10.5)
WBC # FLD AUTO: 1.84 K/UL — LOW (ref 3.8–10.5)

## 2025-02-11 RX ORDER — OXYCODONE HYDROCHLORIDE 30 MG/1
5 TABLET ORAL EVERY 6 HOURS
Refills: 0 | Status: DISCONTINUED | OUTPATIENT
Start: 2025-02-11 | End: 2025-02-12

## 2025-02-11 RX ORDER — ENOXAPARIN SODIUM 100 MG/ML
60 INJECTION SUBCUTANEOUS EVERY 12 HOURS
Refills: 0 | Status: DISCONTINUED | OUTPATIENT
Start: 2025-02-11 | End: 2025-02-11

## 2025-02-11 RX ORDER — ENOXAPARIN SODIUM 100 MG/ML
90 INJECTION SUBCUTANEOUS EVERY 12 HOURS
Refills: 0 | Status: DISCONTINUED | OUTPATIENT
Start: 2025-02-11 | End: 2025-02-12

## 2025-02-11 RX ADMIN — OXYCODONE HYDROCHLORIDE 10 MILLIGRAM(S): 30 TABLET ORAL at 19:20

## 2025-02-11 RX ADMIN — LIDOCAINE HYDROCHLORIDE 1 PATCH: 30 CREAM TOPICAL at 11:17

## 2025-02-11 RX ADMIN — DEXAMETHASONE SODIUM PHOSPHATE 2 MILLIGRAM(S): 4 INJECTION, SOLUTION INTRA-ARTICULAR; INTRALESIONAL; INTRAMUSCULAR; INTRAVENOUS; SOFT TISSUE at 05:03

## 2025-02-11 RX ADMIN — ENOXAPARIN SODIUM 40 MILLIGRAM(S): 100 INJECTION SUBCUTANEOUS at 06:16

## 2025-02-11 RX ADMIN — LEVOTHYROXINE SODIUM 88 MICROGRAM(S): 25 TABLET ORAL at 05:03

## 2025-02-11 RX ADMIN — OXYCODONE HYDROCHLORIDE 10 MILLIGRAM(S): 30 TABLET ORAL at 06:03

## 2025-02-11 RX ADMIN — OXYCODONE HYDROCHLORIDE 10 MILLIGRAM(S): 30 TABLET ORAL at 05:03

## 2025-02-11 RX ADMIN — Medication 2 TABLET(S): at 21:31

## 2025-02-11 RX ADMIN — OXYCODONE HYDROCHLORIDE 10 MILLIGRAM(S): 30 TABLET ORAL at 18:02

## 2025-02-11 RX ADMIN — PANTOPRAZOLE 40 MILLIGRAM(S): 20 TABLET, DELAYED RELEASE ORAL at 05:04

## 2025-02-11 RX ADMIN — DEXAMETHASONE SODIUM PHOSPHATE 2 MILLIGRAM(S): 4 INJECTION, SOLUTION INTRA-ARTICULAR; INTRALESIONAL; INTRAMUSCULAR; INTRAVENOUS; SOFT TISSUE at 18:02

## 2025-02-11 RX ADMIN — ENOXAPARIN SODIUM 90 MILLIGRAM(S): 100 INJECTION SUBCUTANEOUS at 18:02

## 2025-02-11 RX ADMIN — Medication 50 MILLIGRAM(S): at 05:04

## 2025-02-11 RX ADMIN — LIDOCAINE HYDROCHLORIDE 1 PATCH: 30 CREAM TOPICAL at 23:00

## 2025-02-11 RX ADMIN — LIDOCAINE HYDROCHLORIDE 1 PATCH: 30 CREAM TOPICAL at 19:32

## 2025-02-11 RX ADMIN — Medication 5 MILLIGRAM(S): at 05:04

## 2025-02-11 RX ADMIN — POLYETHYLENE GLYCOL 3350 17 GRAM(S): 17 POWDER, FOR SOLUTION ORAL at 11:17

## 2025-02-11 NOTE — DISCHARGE NOTE PROVIDER - NSDCQMPCI_CARD_ALL_CORE
Left message for patient to return call with info regarding med and pharm.  
Pt states medications is not adequate  states he has been out of med's for 2 day and can not get a refill until the 20th please call pt   
Spoke to patient and issue has been resolved.  
No

## 2025-02-11 NOTE — PROGRESS NOTE ADULT - PROBLEM SELECTOR PLAN 2
Follows with Heme onc Dr Colon ([facility]) outpatient. Completed course of Ninlaro ~6 wks ago. Currently on Revlimid.   CT C/A/P - expansile lytic lesions involving ribs, spine pelvic bones, proximal femurs. Multiple subacute healing R rib fractures. No acute pathologic fracture. Hypodensities in the left hepatic lobe and spleen. 1.2 cm RLL pulmonary nodule.   home meds: Completed course of Ninlaro ~6 wks ago. Currently on Revlimid and prednisone 20mg daily.    - c/w prednisone 20mg daily.  - concern for progression of multiple myeloma.   - Duplex US lower extremity to rule out DVT  - pain control.  - Heme onc Dr Colon consulted.   - Palliative consulted  - Social work consulted
Follows with Heme onc Dr Colon ([facility]) outpatient. Completed course of Ninlaro ~6 wks ago. Currently on Revlimid.   CT C/A/P - expansile lytic lesions involving ribs, spine pelvic bones, proximal femurs. Multiple subacute healing R rib fractures. No acute pathologic fracture. Hypodensities in the left hepatic lobe and spleen. 1.2 cm RLL pulmonary nodule.   home meds: Completed course of Ninlaro ~6 wks ago. Currently on Revlimid and prednisone 20mg daily.    - c/w prednisone 20mg daily.  - concern for progression of multiple myeloma.   - pain control.  - Heme onc Dr Colon consulted.   - Palliative consult in the AM.  - family would like more assistance at home given overall functional decline. Will obtain SW/CM consult.  Palliative care Consult   Pain management
Concern for PH, sPAP severely elevated in TTE  Unlikely candidate for invasive workup  - Pulm Dr. Goldberg consulted

## 2025-02-11 NOTE — PHARMACOTHERAPY INTERVENTION NOTE - COMMENTS
Patient identified by [facility] for Patients 66 y/o and Older Report. No intervention at this time.  
PRN moderate to severe pain (4-10)
Patient identified by [facility] for Patients 66 y/o and Older Report.     Oxycodone 10mg BID and 5mg Q6H PRN - multiple myeloma, Palliative following     No intervention at this time.

## 2025-02-11 NOTE — DISCHARGE NOTE PROVIDER - CARE PROVIDER_API CALL
Refugio Kwon  Internal Medicine  [address], [address]  [place] [address]  Phone: [phone]  Fax: [phone]  Established Patient  Follow Up Time:     Samy Goldberg  Internal Medicine  [address], [address]  [place] [address]  Phone: [phone]  Fax: [phone]  Follow Up Time:

## 2025-02-11 NOTE — PROGRESS NOTE ADULT - PROBLEM SELECTOR PLAN 4
home meds: synthroid 88mcg    - TSH  - c/w home meds
home meds: synthroid 88mcg    - TSH  - c/w home meds
[facility] onc [facility]. Completed course of Ninlaro ~6 wks ago. Currently on Revlimid.   CT C/A/P - expansile lytic lesions involving ribs, spine pelvic bones, proximal femurs. Multiple subacute healing R rib fractures. No acute pathologic fracture. Hypodensities in the left hepatic lobe and spleen. 1.2 cm RLL pulmonary nodule.   home meds: Completed course of Ninlaro ~6 wks ago. Currently on Revlimid and prednisone 20mg daily.    - c/w prednisone 20mg daily.  - concern for progression of multiple myeloma.   - Duplex US no DVT   - pain control.  - Heme onc Dr Colon consulted endorsed again today     - Palliative consulted DNR DNI  - Social work consulted

## 2025-02-11 NOTE — CONSULT NOTE ADULT - REASON FOR ADMISSION
syncope, lower extremity swelling

## 2025-02-11 NOTE — PROGRESS NOTE ADULT - SUBJECTIVE AND OBJECTIVE BOX
C A R D I O L O G Y  **********************************     DATE OF SERVICE: 02-11-25    Patient denies chest pain or shortness of breath.   Review of systems otherwise (-)  	  MEDICATIONS:  MEDICATIONS  (STANDING):  amLODIPine   Tablet 5 milliGRAM(s) Oral daily  dexAMETHasone     Tablet 2 milliGRAM(s) Oral every 12 hours  enoxaparin Injectable 40 milliGRAM(s) SubCutaneous every 24 hours  influenza  Vaccine (HIGH DOSE) 0.5 milliLiter(s) IntraMuscular once  levothyroxine 88 MICROGram(s) Oral daily  lidocaine   4% Patch 1 Patch Transdermal daily  losartan 50 milliGRAM(s) Oral daily  naloxone Injectable 0.4 milliGRAM(s) IV Push once  oxyCODONE  ER Tablet 10 milliGRAM(s) Oral every 12 hours  pantoprazole    Tablet 40 milliGRAM(s) Oral before breakfast  polyethylene glycol 3350 17 Gram(s) Oral daily  senna 2 Tablet(s) Oral at bedtime      LABS:	 	    CARDIAC MARKERS:                              7.5    1.84  )-----------( 160      ( 11 Feb 2025 06:40 )             26.5     Hemoglobin: 7.5 g/dL (02-11 @ 06:40)  Hemoglobin: 7.7 g/dL (02-10 @ 10:00)  Hemoglobin: 7.9 g/dL (02-09 @ 12:55)  Hemoglobin: 7.1 g/dL (02-09 @ 05:40)  Hemoglobin: 8.0 g/dL (02-08 @ 17:36)      02-11    139  |  110[H]  |  12  ----------------------------<  127[H]  4.2   |  22  |  0.79    Ca    8.9      11 Feb 2025 06:40  Phos  4.6     02-11  Mg     2.0     02-11    TPro  8.0  /  Alb  2.7[L]  /  TBili  0.2  /  DBili  x   /  AST  10  /  ALT  13  /  AlkPhos  96  02-10    Creatinine Trend: 0.79<--, 0.94<--, 0.82<--, 0.74<--      PHYSICAL EXAM:  T(C): 36.6 (02-11-25 @ 11:17), Max: 37.1 (02-10-25 @ 16:15)  HR: 70 (02-11-25 @ 11:17) (61 - 79)  BP: 106/75 (02-11-25 @ 11:17) (106/75 - 129/57)  RR: 18 (02-11-25 @ 11:17) (18 - 18)  SpO2: 97% (02-11-25 @ 11:17) (96% - 99%)  Wt(kg): --  I&O's Summary        Gen: Appears well in NAD  HEENT:  (-)icterus (-)pallor  CV: N S1 S2 1/6 AYDIN (+)2 Pulses B/l  Resp:  Clear to ausculatation B/L, normal effort  GI: (+) BS Soft, NT, ND  Lymph:  (-)Edema, (-)obvious lymphadenopathy  Skin: Warm to touch, Normal turgor  Psych: Appropriate mood and affect      TELEMETRY: 	  sinus, PAF        ASSESSMENT/PLAN: 	84y  Female ambulates minimally with walker, PMH multiple myeloma, HTN, hypothyroidism. Presenting after syncopal episode and with complaints of worsening bilateral lower extremity swelling ? LOC    # Severe Pulm HTN  - CHeck CTPA r/o chronic thromboembolic disease  - Pulm eval     # NEw PAF  - Start AC if ok with heme onc  - Good candidate NOAC if covered by insurance     # ? Syncope  - EKG located, not in MUSE as it was hand labeled.  Sinus 66 BPM, LVH   - no pertinent findings on tele thus far    # Multiple myeloma  - heme f/u    Lionel Zuleta MD, [facility]  BEEPER [phone]

## 2025-02-11 NOTE — PROGRESS NOTE ADULT - SUBJECTIVE AND OBJECTIVE BOX
PGY-1 Progress Note discussed with attending    Varghese Rodríguez via Teams TILL 5:00 PM  PLEASE CONTACT ON CALL TEAM:  - On Call Team (Please refer to Grover) FROM 5:00 PM - 8:30PM  - Nightfloat Team FROM 8:30 -7:30 AM    INTERVAL HPI/OVERNIGHT EVENTS:   No acute overnight events reported. Telemetry with Afib, no RVR. Patient examined at bedside in the AM, daughter present. Patient continues to endorse back pain during movement, no pain at rest. Daughter states that she will hire private HHA. Plan discussed with patient and daughter, report understanding. All questions answered.    REVIEW OF SYSTEMS:  CONSTITUTIONAL: No fever, weight loss, or fatigue  RESPIRATORY: No cough, wheezing, chills or hemoptysis; No shortness of breath  CARDIOVASCULAR: No chest pain, palpitations, dizziness, or leg swelling  GASTROINTESTINAL: No abdominal pain. No nausea, vomiting, or hematemesis; No diarrhea or constipation. No melena or hematochezia.  GENITOURINARY: No dysuria or hematuria, urinary frequency  NEUROLOGICAL: Back pain. No headaches, memory loss, loss of strength, numbness, or tremors  SKIN: No itching, burning, rashes, or lesions     Vital Signs Last 24 Hrs  T(C): 36.5 (11 Feb 2025 07:22), Max: 37.1 (10 Feb 2025 16:15)  T(F): 97.7 (11 Feb 2025 07:22), Max: 98.8 (10 Feb 2025 16:15)  HR: 62 (11 Feb 2025 07:22) (61 - 79)  BP: 129/57 (11 Feb 2025 07:22) (116/57 - 129/57)  BP(mean): 78 (11 Feb 2025 07:22) (78 - 78)  RR: 18 (11 Feb 2025 07:22) (18 - 18)  SpO2: 97% (11 Feb 2025 07:22) (96% - 99%)    Parameters below as of 11 Feb 2025 07:22  Patient On (Oxygen Delivery Method): room air        PHYSICAL EXAMINATION:  GENERAL: NAD, well built  HEAD:  Atraumatic, Normocephalic  EYES:  conjunctiva and sclera clear  NECK: Supple, No JVD, Normal thyroid  CHEST/LUNG: Clear to auscultation. Clear to percussion bilaterally; No rales, rhonchi, wheezing, or rubs  HEART: Regular rate and rhythm during exam; No murmurs, rubs, or gallops  ABDOMEN: Soft, Nontender, Nondistended; Bowel sounds present  NERVOUS SYSTEM:  Alert & Oriented X3,    EXTREMITIES:  2+ Peripheral Pulses, No clubbing, cyanosis, or edema  SKIN: warm dry                          7.5    1.84  )-----------( 160      ( 11 Feb 2025 06:40 )             26.5     02-11    139  |  110[H]  |  12  ----------------------------<  127[H]  4.2   |  22  |  0.79    Ca    8.9      11 Feb 2025 06:40  Phos  4.6     02-11  Mg     2.0     02-11    TPro  8.0  /  Alb  2.7[L]  /  TBili  0.2  /  DBili  x   /  AST  10  /  ALT  13  /  AlkPhos  96  02-10    LIVER FUNCTIONS - ( 10 Feb 2025 10:00 )  Alb: 2.7 g/dL / Pro: 8.0 g/dL / ALK PHOS: 96 U/L / ALT: 13 U/L DA / AST: 10 U/L / GGT: x               PT/INR - ( 09 Feb 2025 12:55 )   PT: 13.8 sec;   INR: 1.19 ratio         PTT - ( 09 Feb 2025 12:55 )  PTT:33.1 sec    CAPILLARY BLOOD GLUCOSE      RADIOLOGY & ADDITIONAL TESTS:                   PGY-1 Progress Note discussed with attending    Varghese Rodríguez via Teams TILL 5:00 PM  PLEASE CONTACT ON CALL TEAM:  - On Call Team (Please refer to Grover) FROM 5:00 PM - 8:30PM  - Nightfloat Team FROM 8:30 -7:30 AM    INTERVAL HPI/OVERNIGHT EVENTS:   No acute overnight events reported. Telemetry with Afib, no RVR. Patient examined at bedside in the AM, daughter present. Patient continues to endorse back pain during movement, no pain at rest. Daughter states that she will hire private HHA. Plan discussed with patient and daughter, report understanding. All questions answered.    REVIEW OF SYSTEMS:  CONSTITUTIONAL: No fever, weight loss, or fatigue  RESPIRATORY: No cough, wheezing, chills or hemoptysis; No shortness of breath  CARDIOVASCULAR: No chest pain, palpitations, dizziness, or leg swelling  GASTROINTESTINAL: No abdominal pain. No nausea, vomiting, or hematemesis; No diarrhea or constipation. No melena or hematochezia.  GENITOURINARY: No dysuria or hematuria, urinary frequency  NEUROLOGICAL: Back pain. No headaches, memory loss, loss of strength, numbness, or tremors  SKIN: No itching, burning, rashes, or lesions     Vital Signs Last 24 Hrs  T(C): 36.5 (11 Feb 2025 07:22), Max: 37.1 (10 Feb 2025 16:15)  T(F): 97.7 (11 Feb 2025 07:22), Max: 98.8 (10 Feb 2025 16:15)  HR: 62 (11 Feb 2025 07:22) (61 - 79)  BP: 129/57 (11 Feb 2025 07:22) (116/57 - 129/57)  BP(mean): 78 (11 Feb 2025 07:22) (78 - 78)  RR: 18 (11 Feb 2025 07:22) (18 - 18)  SpO2: 97% (11 Feb 2025 07:22) (96% - 99%)    Parameters below as of 11 Feb 2025 07:22  Patient On (Oxygen Delivery Method): room air        PHYSICAL EXAMINATION:  GENERAL: NAD, well built  HEAD:  Atraumatic, Normocephalic  EYES:  conjunctiva and sclera clear  NECK: Supple, No JVD, Normal thyroid  CHEST/LUNG: Clear to auscultation. Clear to percussion bilaterally; No rales, rhonchi, wheezing, or rubs  HEART: Regular rate and rhythm during exam; No murmurs, rubs, or gallops  ABDOMEN: Soft, Nontender, Nondistended; Bowel sounds present  NERVOUS SYSTEM:  Alert & Oriented X3,    EXTREMITIES:  2+ Peripheral Pulses, pitting edema improved  SKIN: warm dry                          7.5    1.84  )-----------( 160      ( 11 Feb 2025 06:40 )             26.5     02-11    139  |  110[H]  |  12  ----------------------------<  127[H]  4.2   |  22  |  0.79    Ca    8.9      11 Feb 2025 06:40  Phos  4.6     02-11  Mg     2.0     02-11    TPro  8.0  /  Alb  2.7[L]  /  TBili  0.2  /  DBili  x   /  AST  10  /  ALT  13  /  AlkPhos  96  02-10    LIVER FUNCTIONS - ( 10 Feb 2025 10:00 )  Alb: 2.7 g/dL / Pro: 8.0 g/dL / ALK PHOS: 96 U/L / ALT: 13 U/L DA / AST: 10 U/L / GGT: x               PT/INR - ( 09 Feb 2025 12:55 )   PT: 13.8 sec;   INR: 1.19 ratio         PTT - ( 09 Feb 2025 12:55 )  PTT:33.1 sec    CAPILLARY BLOOD GLUCOSE      RADIOLOGY & ADDITIONAL TESTS:      DVT study negative  CTA chest negative for PE

## 2025-02-11 NOTE — DISCHARGE NOTE PROVIDER - CARE PROVIDERS DIRECT ADDRESSES
,DirectAddress_Unknown,[email or web address]

## 2025-02-11 NOTE — PHARMACOTHERAPY INTERVENTION NOTE - NSPHARMCOMMMEDSAFE
Age-related (geriatrics) therapy recommendation
Age-related (geriatrics) therapy recommendation
Clarify indication for PRN orders

## 2025-02-11 NOTE — DISCHARGE NOTE PROVIDER - NSDCCPCAREPLAN_GEN_ALL_CORE_FT
PRINCIPAL DISCHARGE DIAGNOSIS  Diagnosis: Syncope  Assessment and Plan of Treatment: You presented to the ED after loosing consciousness at home. The episode was likely triggered by severe pain. In the ED, you vital signs were stable. A CT of your head was performed and without acute pathologies. A TTE (ultrasound of the heart) was performed and concerning for pulmonary hypertension (see below). A CT of your chest was performed with contrast and ruled out a blood clot in your lungs. You were found to have atrial fibrillation while being monitored (telemetry). A Cardiologist was consulted and recommended anticoagulation.   Please continue to take your medication as prescribed and follow up with your PCP within one week of discharge.      SECONDARY DISCHARGE DIAGNOSES  Diagnosis: Multiple myeloma  Assessment and Plan of Treatment: You have a history of multiple     PRINCIPAL DISCHARGE DIAGNOSIS  Diagnosis: Syncope  Assessment and Plan of Treatment: You presented to the ED after loosing consciousness at home. The episode was likely triggered by severe pain. In the ED, you vital signs were stable. A CT of your head was performed and without acute pathologies. A TTE (ultrasound of the heart) was performed and concerning for pulmonary hypertension (see below). A CT of your chest was performed with contrast and ruled out a blood clot in your lungs. You were found to have atrial fibrillation while being monitored (telemetry). A Cardiologist was consulted and recommended anticoagulation.   Please continue to take your medication as prescribed and follow up with your PCP within one week of discharge.      SECONDARY DISCHARGE DIAGNOSES  Diagnosis: Multiple myeloma  Assessment and Plan of Treatment: You have a history of multiple myeloma, for which you are being treated outpatient with prednisone 20mg. During this admission, a CT scan of your chest confirmed multiple lesions in your ribs, likely in the setting of your underlying multiple myeloma. You were evaluated by your outpatient Hematologist/Oncologist and no further inpatient intervention was required.  Please continue to take your home medication as prescirbed and follow up with your PCP within 1 week of discharge. Please also follow up with your Hematologist/Oncologist within 1-2 weeks after discharge.    Diagnosis: Abnormal CT scan  Assessment and Plan of Treatment: You were found to have lytic lesions in your ribs, likely in the setting of your multiple myeloma. You were further found to have indeterminate hypodensities in the left hepatic lobe and spleen, a 1.2 cm right lower lobe pulmonary nodule and a 13.7 cm complex fat density mass with soft tissue density and calcifications in the left posterior shoulder.  Please follow up with your Hematologist Oncologist outpatient to evaluate the need for further workup or treatment.    Diagnosis: Pulmonary hypertension  Assessment and Plan of Treatment: As part of your syncope workup, a TTE (ultrasound of your heart) was completed and showed signs of elevated blood pressure in the blood vessels of your lung. You were evaluated by a Pulmonologist who recommended XXX. You family declined further invasive workup.  Please continue to take all your medication as prescribed and follow up with your PCP within one week after discharge. Please also follow up with a Pulmonologist after discharge.    Diagnosis: New onset atrial fibrillation  Assessment and Plan of Treatment: You were found to have atrial fibrillation on telemetry monitoring of your heart. Atrial fibrillation is a heart rhythm disorder that causes irregular heart beats, palpitations and increases your risk for blood clots. You were evaluated by a Cardiologist who recommended that you start anticoagulation.  Please continue to take Eliquis as prescribed and follow up with your PCP within one week after discharge.    Diagnosis: HTN (hypertension)  Assessment and Plan of Treatment: You have a history of Hypertension.  You are taking Amlodipine and losartan at home. Your blood pressure was well controlled on your home regimen during this admission.  Your blood pressure target is 130/90, maintain healthy lifestyle, low salt diet, avoid fatty food, weight loss, exercise regularly or stay active as tolerated 30 mins X 3 times per week.  Notify your doctor if you have any of the following symptoms:   (Dizziness, Lightheadedness, Blurry vision, Headache, Chest pain, Shortness of breath.)  Please continue taking your home medications and follow-up with your PCP in 1 week from discharge to adjust medications as needed.    Diagnosis: Hypothyroidism  Assessment and Plan of Treatment: You have a history of hypothyroidism, which is low function of your thyroid gland. You are taking Synthroid 88mg at home. Lab workup revealed that your hypothyroidism is well controlled.   Continue to take your home medication as prescribed and follow up with your PCP within one week after discharge.     PRINCIPAL DISCHARGE DIAGNOSIS  Diagnosis: Syncope  Assessment and Plan of Treatment: You presented to the ED after loosing consciousness at home. The episode was likely triggered by severe pain. In the ED, you vital signs were stable. A CT of your head was performed and without acute pathologies. A TTE (ultrasound of the heart) was performed and concerning for pulmonary hypertension (see below). A CT of your chest was performed with contrast and ruled out a blood clot in your lungs. You were found to have atrial fibrillation while being monitored (telemetry). A Cardiologist was consulted and recommended anticoagulation.   Please continue to take your medication as prescribed and follow up with your PCP within one week of discharge.      SECONDARY DISCHARGE DIAGNOSES  Diagnosis: Multiple myeloma  Assessment and Plan of Treatment: You have a history of multiple myeloma, for which you are being treated outpatient with prednisone 20mg. During this admission, a CT scan of your chest confirmed multiple lesions in your ribs, likely in the setting of your underlying multiple myeloma. You were evaluated by your outpatient Hematologist/Oncologist and no further inpatient intervention was required.  Please continue to take your home medication as prescirbed and follow up with your PCP within 1 week of discharge. Please also follow up with your Hematologist/Oncologist within 1-2 weeks after discharge.    Diagnosis: Abnormal CT scan  Assessment and Plan of Treatment: You were found to have lytic lesions in your ribs, likely in the setting of your multiple myeloma. You were further found to have indeterminate hypodensities in the left hepatic lobe and spleen, a 1.2 cm right lower lobe pulmonary nodule and a 13.7 cm complex fat density mass with soft tissue density and calcifications in the left posterior shoulder.  Please follow up with your Hematologist Oncologist outpatient to evaluate the need for further workup or treatment.    Diagnosis: Pulmonary hypertension  Assessment and Plan of Treatment: As part of your syncope workup, a TTE (ultrasound of your heart) was completed and showed signs of elevated blood pressure in the blood vessels of your lung. You family declined further invasive workup.  Please follow up with a pulmonologist outpatient for further management. Please continue to take all your medication as prescribed and follow up with your PCP within one week after discharge. Please also follow up with a Pulmonologist after discharge.    Diagnosis: New onset atrial fibrillation  Assessment and Plan of Treatment: You were found to have atrial fibrillation on telemetry monitoring of your heart. Atrial fibrillation is a heart rhythm disorder that causes irregular heart beats, palpitations and increases your risk for blood clots. You were evaluated by a Cardiologist who recommended that you start anticoagulation.  Please continue to take Eliquis as prescribed and follow up with your PCP within one week after discharge.    Diagnosis: HTN (hypertension)  Assessment and Plan of Treatment: You have a history of Hypertension.  You are taking Amlodipine and losartan at home. Your blood pressure was well controlled on your home regimen during this admission.  Your blood pressure target is 130/90, maintain healthy lifestyle, low salt diet, avoid fatty food, weight loss, exercise regularly or stay active as tolerated 30 mins X 3 times per week.  Notify your doctor if you have any of the following symptoms:   (Dizziness, Lightheadedness, Blurry vision, Headache, Chest pain, Shortness of breath.)  Please continue taking your home medications and follow-up with your PCP in 1 week from discharge to adjust medications as needed.    Diagnosis: Hypothyroidism  Assessment and Plan of Treatment: You have a history of hypothyroidism, which is low function of your thyroid gland. You are taking Synthroid 88mg at home. Lab workup revealed that your hypothyroidism is well controlled.   Continue to take your home medication as prescribed and follow up with your PCP within one week after discharge.

## 2025-02-11 NOTE — DISCHARGE NOTE PROVIDER - NSDCMRMEDTOKEN_GEN_ALL_CORE_FT
levothyroxine 88 mcg (0.088 mg) oral tablet: 1 tab(s) orally once a day  losartan 50 mg oral tablet: 1 tab(s) orally once a day  Norvasc 5 mg oral tablet: 1 tab(s) orally once a day  omeprazole 40 mg oral delayed release capsule: 1 cap(s) orally once a day  predniSONE 20 mg oral tablet: 1 tab(s) orally once a day   Eliquis 5 mg oral tablet: 1 tab(s) orally 2 times a day  levothyroxine 88 mcg (0.088 mg) oral tablet: 1 tab(s) orally once a day  lidocaine 4% topical film: Apply topically to affected area once a day  losartan 50 mg oral tablet: 1 tab(s) orally once a day  MiraLax oral powder for reconstitution: 17 gram(s) orally once a day  Norvasc 5 mg oral tablet: 1 tab(s) orally once a day  omeprazole 40 mg oral delayed release capsule: 1 cap(s) orally once a day  predniSONE 20 mg oral tablet: 1 tab(s) orally once a day   Eliquis 5 mg oral tablet: 1 tab(s) orally 2 times a day  levothyroxine 88 mcg (0.088 mg) oral tablet: 1 tab(s) orally once a day  lidocaine 4% topical film: Apply topically to affected area once a day  losartan 50 mg oral tablet: 1 tab(s) orally once a day  MiraLax oral powder for reconstitution: 17 gram(s) orally once a day  Norvasc 5 mg oral tablet: 1 tab(s) orally once a day  omeprazole 40 mg oral delayed release capsule: 1 cap(s) orally once a day  oxyCODONE 5 mg oral tablet: 1 tab(s) orally every 12 hours as needed for  severe pain MDD: 10 mg  predniSONE 20 mg oral tablet: 1 tab(s) orally once a day

## 2025-02-11 NOTE — CONSULT NOTE ADULT - CONSULT REASON
Abnormal Echo, Syncope
Multiple myeloma, intractable pain, syncope
Discuss complex medical decision making related to goals of care

## 2025-02-11 NOTE — PROGRESS NOTE ADULT - ATTENDING COMMENTS
Syncope     Metastatic Multiple Myeloma     CT C/A/P - expansile lytic lesions involving ribs, spine pelvic bones, proximal femurs. Multiple subacute healing R rib fractures. No acute pathologic fracture. Hypodensities in the left hepatic lobe and spleen. 1.2 cm RLL pulmonary nodule.       Pain meds   Hem consulted  Follow up Echo  TTE with elevated systolic PAP, otherwise wnl  Cards consulted      Dopplers, CTA chest r/o PE
Metastatic Multiple Myeloma  Pain Mnagament   GOC Discussion Patient DNR/DNI  Pulm Htn Pulm eval     D/C planning am tomorrow

## 2025-02-11 NOTE — PROGRESS NOTE ADULT - PROBLEM SELECTOR PLAN 1
Presenting after syncopal episode and with complaints of worsening bilateral lower extremity swelling.  trop neg.  - likely vasovagal iso intractable skeletal pain 2/2 progression of multiple myeloma. However, will need to r/o orthostatics and cardiac etiology. Low suspicion for seizures.  - EKG.  - TTE.    CT head
Presenting after syncopal episode and with complaints of worsening bilateral lower extremity swelling.  trop neg.  - likely vasovagal iso intractable skeletal pain 2/2 progression of multiple myeloma. However, will need to r/o orthostatics and cardiac etiology. Low suspicion for seizures.  - TTE with elevated systolic PAP, otherwise wnl  - Home PT recommended  - Cardiology Dr. Zuleta consulted
Presenting after syncopal episode and with complaints of worsening bilateral lower extremity swelling.  trop neg.  - likely vasovagal iso intractable skeletal pain 2/2 progression of multiple myeloma. However, will need to r/o orthostatics and cardiac etiology. Low suspicion for seizures.  - TTE with elevated systolic PAP, otherwise wnl  - Home PT recommended  - Cardiology Dr. Zuleta following

## 2025-02-11 NOTE — CONSULT NOTE ADULT - ASSESSMENT
Ms Rodríguez is a pleasant 84 year old female with history of myeloma admitted for syncope and intractable pain    1. Multiple Myeloma  ·	Patient follows with DR. Colon at [facility]  ·	currently on ninlaro and revlimid  ·	She finished with her cycle of ninlaro  ·	now does not wish to continue with ninlaro  ·	Patient is willing to continue with revlimid.  She can continue inpatient  ·	Patient had no sign of progression on most recent myeloma labs on 1/3.   ·	Can repeat myeloma labs including SPEP, free light chain assay.    ·	Discussed with patient and her daughter at length revlimid alone will not control her myeloma.  Patient understand and wants to proceed with revlimid alone.  Patient has previous history of noncompliance.    2. Syncope  ·	Unclear the source.  ·	Neuro and cardio eval    3. Intractable pain  ·	Patient found to have subacute healing fractures in the right rib.  Multiple lytic lesions consistent with myeloma  ·	management per palliative care.      WIll cont to follow

## 2025-02-11 NOTE — CONSULT NOTE ADULT - SUBJECTIVE AND OBJECTIVE BOX
Reason for consult:    HPI:  84F, from home, ambulates minimally with walker, PMH multiple myeloma, HTN, hypothyroidism. Presenting after syncopal episode and with complaints of worsening bilateral lower extremity swelling. Collateral obtained from daughter at bedside. Episode of syncope was unwitnessed. Patient endorses had bilateral rib pain prior to "passing out"; found herself on the floor. States she does not remember everything that happened prior to episode. Denies urinary or fecal incontinence, or tongue biting during episode. Managed to crawl up and call daughter who subsequently sent her to the ED. States has had worsening bilateral rib pain in the past couple of months. Taking tramadol PRN with minimal relief. Denies fever, chills, chest pain, palpitations, SOB, n/v/d, dysuria, numbness ro tingling of ext.     Follows with Heme onc Dr Colon ([facility]) outpatient. Completed course of Ninlaro ~6 wks ago. Currently on Revlimid.     As per daughter, patient's overall functional status has declined in the past 3 months - mostly bedbound, minimally ambulatory. States needs assistance at home. (08 Feb 2025 23:10)      PAST MEDICAL & SURGICAL HISTORY:  Depression, reactive      Hypothyroid      Multiple myeloma      HTN (hypertension)      H/O ovarian cystectomy      S/P breast lumpectomy          FAMILY HISTORY:      Alochol: Denied  Smoking: Nonsmoker  Drug Use: Denied  Marital Status:         Allergies    No Known Allergies    Intolerances        MEDICATIONS  (STANDING):  amLODIPine   Tablet 5 milliGRAM(s) Oral daily  dexAMETHasone     Tablet 2 milliGRAM(s) Oral every 12 hours  enoxaparin Injectable 40 milliGRAM(s) SubCutaneous every 24 hours  influenza  Vaccine (HIGH DOSE) 0.5 milliLiter(s) IntraMuscular once  levothyroxine 88 MICROGram(s) Oral daily  lidocaine   4% Patch 1 Patch Transdermal daily  losartan 50 milliGRAM(s) Oral daily  naloxone Injectable 0.4 milliGRAM(s) IV Push once  oxyCODONE  ER Tablet 10 milliGRAM(s) Oral every 12 hours  pantoprazole    Tablet 40 milliGRAM(s) Oral before breakfast  polyethylene glycol 3350 17 Gram(s) Oral daily  senna 2 Tablet(s) Oral at bedtime    MEDICATIONS  (PRN):  acetaminophen     Tablet .. 650 milliGRAM(s) Oral every 6 hours PRN Temp greater or equal to 38C (100.4F), Mild Pain (1 - 3)  bisacodyl 5 milliGRAM(s) Oral daily PRN Constipation  oxyCODONE    IR 5 milliGRAM(s) Oral every 6 hours PRN moderate/severe pain (4 - 10)      ROS  No fever, sweats, chills  No epistaxis, HA, sore throat  No CP, SOB, cough, sputum  No n/v/d, abd pain, melena, hematochezia  No edema  No rash  No anxiety  No back pain, joint pain  No bleeding, bruising  No dysuria, hematuria    T(C): 36.6 (02-11-25 @ 11:17), Max: 37.1 (02-10-25 @ 16:15)  HR: 70 (02-11-25 @ 11:17) (61 - 79)  BP: 106/75 (02-11-25 @ 11:17) (106/75 - 129/57)  RR: 18 (02-11-25 @ 11:17) (18 - 18)  SpO2: 97% (02-11-25 @ 11:17) (96% - 99%)  Wt(kg): --    PE  NAD  Awake, alert  Anicteric, MMM  RRR  CTAB  Abd soft, NT, ND  No c/c/e  No rash grossly  FROM                          7.5    1.84  )-----------( 160      ( 11 Feb 2025 06:40 )             26.5       02-11    139  |  110[H]  |  12  ----------------------------<  127[H]  4.2   |  22  |  0.79    Ca    8.9      11 Feb 2025 06:40  Phos  4.6     02-11  Mg     2.0     02-11    TPro  8.0  /  Alb  2.7[L]  /  TBili  0.2  /  DBili  x   /  AST  10  /  ALT  13  /  AlkPhos  96  02-10

## 2025-02-11 NOTE — DISCHARGE NOTE PROVIDER - HOSPITAL COURSE
84F, from home, ambulates minimally with walker, PMH multiple myeloma, HTN, hypothyroidism. Presenting after syncopal episode and with complaints of worsening bilateral lower extremity swelling.    In the ED, vital signs stable (/87mmHg, HR 78BPM, T 97.6, RR 16, SpO2 95%). Lab workup significant for leukopenia and anemia, UA and RVP negative. CXR with small bilateral pleural effusions and cardiomegaly. CT chest and abdomen with expansile lytic lesions involving the ribs, visualized spine, pelvic bones, and proximal femurs, consistent with history of multiple myeloma. Indeterminate hypodensities in the left hepatic lobe and spleen. 1.2 cm right lower lobe pulmonary nodule. 13.7 cm complex fat density mass with soft tissue density and calcifications in the left posterior shoulder. CT head negative for acute pathologies. Patient admitted for syncopal event and intractable back pain iso MM.    After admission, Cardiology consulted. TTE performed, suspected PH with sPAP 80mmHg. CT A PE protocol ruled out PE/CTEPH. Duplex LE negative for DVT. Pulm consulted for concern of PH, recommended XXX. Palliative consulted, patient made DNR/DNI with new pain regimen. Heme Onc consulted, no acute inpatient intervention required. Patient with atrial fibrillation on telemetry, started on FD Lovenox. Transitioned to Eliquis prior to discharge.    Patient hemodynamically stable and ready for discharge. 84F, from home, ambulates minimally with walker, PMH multiple myeloma, HTN, hypothyroidism. Presenting after syncopal episode and with complaints of worsening bilateral lower extremity swelling.    In the ED, vital signs stable (/87mmHg, HR 78BPM, T 97.6, RR 16, SpO2 95%). Lab workup significant for leukopenia and anemia, UA and RVP negative. CXR with small bilateral pleural effusions and cardiomegaly. CT chest and abdomen with expansile lytic lesions involving the ribs, visualized spine, pelvic bones, and proximal femurs, consistent with history of multiple myeloma. Indeterminate hypodensities in the left hepatic lobe and spleen. 1.2 cm right lower lobe pulmonary nodule. 13.7 cm complex fat density mass with soft tissue density and calcifications in the left posterior shoulder. CT head negative for acute pathologies. Patient admitted for syncopal event and intractable back pain iso MM.    After admission, Cardiology consulted. TTE performed, suspected PH with sPAP 80mmHg. CT A PE protocol ruled out PE/CTEPH. Duplex LE negative for DVT. Pulm consulted for concern of PH, recommended outpatient follow up. Palliative consulted, patient made DNR/DNI with new pain regimen. Heme Onc consulted, no acute inpatient intervention required. Patient with atrial fibrillation on telemetry, started on FD Lovenox. Transitioned to Eliquis prior to discharge.    Patient hemodynamically stable and ready for discharge.

## 2025-02-11 NOTE — PROGRESS NOTE ADULT - PROBLEM SELECTOR PLAN 3
home meds: norvasc 5, losartan 50    - c/w home meds
home meds: norvasc 5, losartan 50    - c/w home meds
Afib on Telemetry, patient asymptomatic, denies palpitations  Normofrequent on telemetry  - Start Ramon Zuleta and Dr. Colon

## 2025-02-12 VITALS
SYSTOLIC BLOOD PRESSURE: 144 MMHG | TEMPERATURE: 98 F | OXYGEN SATURATION: 97 % | HEART RATE: 67 BPM | RESPIRATION RATE: 18 BRPM | DIASTOLIC BLOOD PRESSURE: 63 MMHG

## 2025-02-12 LAB
ANION GAP SERPL CALC-SCNC: 5 MMOL/L — SIGNIFICANT CHANGE UP (ref 5–17)
BUN SERPL-MCNC: 17 MG/DL — SIGNIFICANT CHANGE UP (ref 7–18)
CALCIUM SERPL-MCNC: 9.2 MG/DL — SIGNIFICANT CHANGE UP (ref 8.4–10.5)
CHLORIDE SERPL-SCNC: 109 MMOL/L — HIGH (ref 96–108)
CO2 SERPL-SCNC: 23 MMOL/L — SIGNIFICANT CHANGE UP (ref 22–31)
CREAT SERPL-MCNC: 0.73 MG/DL — SIGNIFICANT CHANGE UP (ref 0.5–1.3)
EGFR: 82 ML/MIN/1.73M2 — SIGNIFICANT CHANGE UP
GLUCOSE SERPL-MCNC: 140 MG/DL — HIGH (ref 70–99)
HCT VFR BLD CALC: 25.8 % — LOW (ref 34.5–45)
HGB BLD-MCNC: 7.3 G/DL — LOW (ref 11.5–15.5)
MAGNESIUM SERPL-MCNC: 1.9 MG/DL — SIGNIFICANT CHANGE UP (ref 1.6–2.6)
MCHC RBC-ENTMCNC: 24.5 PG — LOW (ref 27–34)
MCHC RBC-ENTMCNC: 28.3 G/DL — LOW (ref 32–36)
MCV RBC AUTO: 86.6 FL — SIGNIFICANT CHANGE UP (ref 80–100)
NRBC BLD AUTO-RTO: 0 /100 WBCS — SIGNIFICANT CHANGE UP (ref 0–0)
PHOSPHATE SERPL-MCNC: 3.9 MG/DL — SIGNIFICANT CHANGE UP (ref 2.5–4.5)
PLATELET # BLD AUTO: 231 K/UL — SIGNIFICANT CHANGE UP (ref 150–400)
POTASSIUM SERPL-MCNC: 3.7 MMOL/L — SIGNIFICANT CHANGE UP (ref 3.5–5.3)
POTASSIUM SERPL-SCNC: 3.7 MMOL/L — SIGNIFICANT CHANGE UP (ref 3.5–5.3)
RBC # BLD: 2.98 M/UL — LOW (ref 3.8–5.2)
RBC # FLD: 19.6 % — HIGH (ref 10.3–14.5)
SODIUM SERPL-SCNC: 137 MMOL/L — SIGNIFICANT CHANGE UP (ref 135–145)
TSH SERPL-MCNC: 1.36 UU/ML — SIGNIFICANT CHANGE UP (ref 0.34–4.82)
WBC # BLD: 2.15 K/UL — LOW (ref 3.8–10.5)
WBC # FLD AUTO: 2.15 K/UL — LOW (ref 3.8–10.5)

## 2025-02-12 PROCEDURE — 83880 ASSAY OF NATRIURETIC PEPTIDE: CPT

## 2025-02-12 PROCEDURE — 99285 EMERGENCY DEPT VISIT HI MDM: CPT | Mod: 25

## 2025-02-12 PROCEDURE — 86900 BLOOD TYPING SEROLOGIC ABO: CPT

## 2025-02-12 PROCEDURE — 93970 EXTREMITY STUDY: CPT

## 2025-02-12 PROCEDURE — 80048 BASIC METABOLIC PNL TOTAL CA: CPT

## 2025-02-12 PROCEDURE — 74176 CT ABD & PELVIS W/O CONTRAST: CPT | Mod: MC

## 2025-02-12 PROCEDURE — 84100 ASSAY OF PHOSPHORUS: CPT

## 2025-02-12 PROCEDURE — 71250 CT THORAX DX C-: CPT | Mod: MC

## 2025-02-12 PROCEDURE — 81001 URINALYSIS AUTO W/SCOPE: CPT

## 2025-02-12 PROCEDURE — 86850 RBC ANTIBODY SCREEN: CPT

## 2025-02-12 PROCEDURE — 71275 CT ANGIOGRAPHY CHEST: CPT | Mod: MC

## 2025-02-12 PROCEDURE — 36415 COLL VENOUS BLD VENIPUNCTURE: CPT

## 2025-02-12 PROCEDURE — 71045 X-RAY EXAM CHEST 1 VIEW: CPT

## 2025-02-12 PROCEDURE — 85730 THROMBOPLASTIN TIME PARTIAL: CPT

## 2025-02-12 PROCEDURE — 96375 TX/PRO/DX INJ NEW DRUG ADDON: CPT

## 2025-02-12 PROCEDURE — 80053 COMPREHEN METABOLIC PANEL: CPT

## 2025-02-12 PROCEDURE — 84443 ASSAY THYROID STIM HORMONE: CPT

## 2025-02-12 PROCEDURE — 84484 ASSAY OF TROPONIN QUANT: CPT

## 2025-02-12 PROCEDURE — 86901 BLOOD TYPING SEROLOGIC RH(D): CPT

## 2025-02-12 PROCEDURE — 85025 COMPLETE CBC W/AUTO DIFF WBC: CPT

## 2025-02-12 PROCEDURE — 70450 CT HEAD/BRAIN W/O DYE: CPT | Mod: MC

## 2025-02-12 PROCEDURE — 87637 SARSCOV2&INF A&B&RSV AMP PRB: CPT

## 2025-02-12 PROCEDURE — 83735 ASSAY OF MAGNESIUM: CPT

## 2025-02-12 PROCEDURE — 85027 COMPLETE CBC AUTOMATED: CPT

## 2025-02-12 PROCEDURE — 85610 PROTHROMBIN TIME: CPT

## 2025-02-12 PROCEDURE — 93306 TTE W/DOPPLER COMPLETE: CPT

## 2025-02-12 PROCEDURE — 97162 PT EVAL MOD COMPLEX 30 MIN: CPT

## 2025-02-12 PROCEDURE — 96374 THER/PROPH/DIAG INJ IV PUSH: CPT

## 2025-02-12 RX ORDER — OXYCODONE HYDROCHLORIDE 30 MG/1
1 TABLET ORAL
Qty: 6 | Refills: 0
Start: 2025-02-12 | End: 2025-02-14

## 2025-02-12 RX ORDER — POLYETHYLENE GLYCOL 3350 17 G/17G
17 POWDER, FOR SOLUTION ORAL
Qty: 510 | Refills: 0
Start: 2025-02-12 | End: 2025-03-13

## 2025-02-12 RX ORDER — OXYCODONE HYDROCHLORIDE 30 MG/1
1 TABLET ORAL
Qty: 9 | Refills: 0
Start: 2025-02-12 | End: 2025-02-14

## 2025-02-12 RX ORDER — APIXABAN 5 MG/1
1 TABLET, FILM COATED ORAL
Qty: 60 | Refills: 0
Start: 2025-02-12 | End: 2025-03-13

## 2025-02-12 RX ORDER — LIDOCAINE HYDROCHLORIDE 30 MG/G
1 CREAM TOPICAL
Qty: 5 | Refills: 0
Start: 2025-02-12 | End: 2025-02-16

## 2025-02-12 RX ADMIN — LIDOCAINE HYDROCHLORIDE 1 PATCH: 30 CREAM TOPICAL at 11:07

## 2025-02-12 RX ADMIN — PANTOPRAZOLE 40 MILLIGRAM(S): 20 TABLET, DELAYED RELEASE ORAL at 05:27

## 2025-02-12 RX ADMIN — Medication 5 MILLIGRAM(S): at 05:27

## 2025-02-12 RX ADMIN — OXYCODONE HYDROCHLORIDE 10 MILLIGRAM(S): 30 TABLET ORAL at 05:27

## 2025-02-12 RX ADMIN — POLYETHYLENE GLYCOL 3350 17 GRAM(S): 17 POWDER, FOR SOLUTION ORAL at 11:06

## 2025-02-12 RX ADMIN — Medication 50 MILLIGRAM(S): at 05:27

## 2025-02-12 RX ADMIN — ENOXAPARIN SODIUM 90 MILLIGRAM(S): 100 INJECTION SUBCUTANEOUS at 05:27

## 2025-02-12 RX ADMIN — LEVOTHYROXINE SODIUM 88 MICROGRAM(S): 25 TABLET ORAL at 05:27

## 2025-02-12 RX ADMIN — DEXAMETHASONE SODIUM PHOSPHATE 2 MILLIGRAM(S): 4 INJECTION, SOLUTION INTRA-ARTICULAR; INTRALESIONAL; INTRAMUSCULAR; INTRAVENOUS; SOFT TISSUE at 05:27

## 2025-02-12 NOTE — PROGRESS NOTE ADULT - SUBJECTIVE AND OBJECTIVE BOX
Patient is a 84y old  Female who presents with a chief complaint of syncope, lower extremity swelling (11 Feb 2025 16:02)    subjective:  patient is feeling better.  No overnight events    MEDICATIONS  (STANDING):  amLODIPine   Tablet 5 milliGRAM(s) Oral daily  dexAMETHasone     Tablet 2 milliGRAM(s) Oral every 12 hours  enoxaparin Injectable 90 milliGRAM(s) SubCutaneous every 12 hours  influenza  Vaccine (HIGH DOSE) 0.5 milliLiter(s) IntraMuscular once  levothyroxine 88 MICROGram(s) Oral daily  lidocaine   4% Patch 1 Patch Transdermal daily  losartan 50 milliGRAM(s) Oral daily  naloxone Injectable 0.4 milliGRAM(s) IV Push once  oxyCODONE  ER Tablet 10 milliGRAM(s) Oral every 12 hours  pantoprazole    Tablet 40 milliGRAM(s) Oral before breakfast  polyethylene glycol 3350 17 Gram(s) Oral daily  senna 2 Tablet(s) Oral at bedtime    MEDICATIONS  (PRN):  acetaminophen     Tablet .. 650 milliGRAM(s) Oral every 6 hours PRN Temp greater or equal to 38C (100.4F), Mild Pain (1 - 3)  bisacodyl 5 milliGRAM(s) Oral daily PRN Constipation  oxyCODONE    IR 5 milliGRAM(s) Oral every 6 hours PRN moderate/severe pain (4 - 10)      ROS  No fever, sweats, chills  No epistaxis, HA, sore throat  No CP, SOB, cough, sputum  No n/v/d, abd pain, melena, hematochezia  No edema  No rash  No anxiety  No back pain, joint pain  No bleeding, bruising  No dysuria, hematuria    Vital Signs Last 24 Hrs  T(C): 36.2 (12 Feb 2025 07:32), Max: 36.7 (11 Feb 2025 20:38)  T(F): 97.2 (12 Feb 2025 07:32), Max: 98.1 (11 Feb 2025 20:38)  HR: 67 (12 Feb 2025 07:32) (65 - 73)  BP: 121/62 (12 Feb 2025 07:32) (106/75 - 139/79)  BP(mean): 80 (12 Feb 2025 07:32) (77 - 92)  RR: 18 (12 Feb 2025 07:32) (18 - 19)  SpO2: 100% (12 Feb 2025 07:32) (96% - 100%)    Parameters below as of 12 Feb 2025 07:32  Patient On (Oxygen Delivery Method): room air        PE  NAD  Awake, alert  Anicteric, MMM  RRR  CTAB  Abd soft, NT, ND  No c/c/e  No rash grossly  FROM                          7.3    2.15  )-----------( 231      ( 12 Feb 2025 06:26 )             25.8       02-12    137  |  109[H]  |  17  ----------------------------<  140[H]  3.7   |  23  |  0.73    Ca    9.2      12 Feb 2025 06:26  Phos  3.9     02-12  Mg     1.9     02-12    TPro  8.0  /  Alb  2.7[L]  /  TBili  0.2  /  DBili  x   /  AST  10  /  ALT  13  /  AlkPhos  96  02-10

## 2025-02-12 NOTE — DISCHARGE NOTE NURSING/CASE MANAGEMENT/SOCIAL WORK - PATIENT PORTAL LINK FT
You can access the FollowMyHealth Patient Portal offered by [facility] by registering at the following website: [email or web address]. By joining [facility]’s FollowMyHealth portal, you will also be able to view your health information using other applications (apps) compatible with our system.

## 2025-02-12 NOTE — PROGRESS NOTE ADULT - ASSESSMENT
Ms Rodríguez is a pleasant 84 year old female with history of myeloma admitted for syncope and intractable pain    1. Multiple Myeloma  ·	Patient follows with DR. Colon at [facility]  ·	currently on ninlaro and revlimid  ·	She finished with her cycle of ninlaro  ·	now does not wish to continue with ninlaro  ·	Patient is willing to continue with revlimid.  She can continue inpatient  ·	Patient had no sign of progression on most recent myeloma labs on 1/3.   ·	Can repeat myeloma labs including SPEP, free light chain assay.    ·	Discussed with patient and her daughter at length revlimid alone will not control her myeloma.  Patient understand and wants to proceed with revlimid alone.  Patient has previous history of noncompliance.    2. Syncope  ·	? afib/flutter  ·	Neuro and cardio eval    3. Intractable pain  ·	Patient found to have subacute healing fractures in the right rib.  Multiple lytic lesions consistent with myeloma  ·	management per palliative care.      4. A fib  ·	newly diagnosed  ·	no objection for eliquis from hematology standpoint.  ·	monitor for clinical bleeding as it can exacerbate anemia.     WIll cont to follow  
84F, from home, ambulates minimally with walker, PMH multiple myeloma, HTN, hypothyroidism. Presenting after syncopal episode and with complaints of worsening bilateral lower extremity swelling. CT C/A/P showing expansile lytic lesions involving ribs, spine pelvic bones, proximal femurs. Multiple subacute healing R rib fractures. No acute pathologic fracture. Hypodensities in the left hepatic lobe and spleen. 1.2 cm RLL pulmonary nodule. Admitted for syncope workup and progression of multiple myeloma.   

## 2025-02-12 NOTE — DISCHARGE NOTE NURSING/CASE MANAGEMENT/SOCIAL WORK - NSDCPEFALRISK_GEN_ALL_CORE
For information on Fall & Injury Prevention, visit: [email or web address] OR  [email or web address]/[email or web address] OR  https://www.cdc.gov/steadi/patient.html

## 2025-02-12 NOTE — DISCHARGE NOTE NURSING/CASE MANAGEMENT/SOCIAL WORK - FINANCIAL ASSISTANCE
[facility] provides services at a reduced cost to those who are determined to be eligible through [facility]’s financial assistance program. Information regarding [facility]’s financial assistance program can be found by going to [email or web address] or by calling [phone].

## 2025-02-12 NOTE — PROGRESS NOTE ADULT - SUBJECTIVE AND OBJECTIVE BOX
C A R D I O L O G Y  **********************************     DATE OF SERVICE: 02-12-25    Patient denies chest pain or shortness of breath.   Review of symptoms otherwise negative.    acetaminophen     Tablet .. 650 milliGRAM(s) Oral every 6 hours PRN  amLODIPine   Tablet 5 milliGRAM(s) Oral daily  bisacodyl 5 milliGRAM(s) Oral daily PRN  dexAMETHasone     Tablet 2 milliGRAM(s) Oral every 12 hours  enoxaparin Injectable 90 milliGRAM(s) SubCutaneous every 12 hours  influenza  Vaccine (HIGH DOSE) 0.5 milliLiter(s) IntraMuscular once  levothyroxine 88 MICROGram(s) Oral daily  lidocaine   4% Patch 1 Patch Transdermal daily  losartan 50 milliGRAM(s) Oral daily  naloxone Injectable 0.4 milliGRAM(s) IV Push once  oxyCODONE    IR 5 milliGRAM(s) Oral every 6 hours PRN  oxyCODONE  ER Tablet 10 milliGRAM(s) Oral every 12 hours  pantoprazole    Tablet 40 milliGRAM(s) Oral before breakfast  polyethylene glycol 3350 17 Gram(s) Oral daily  senna 2 Tablet(s) Oral at bedtime                            7.3    2.15  )-----------( 231      ( 12 Feb 2025 06:26 )             25.8       Hemoglobin: 7.3 g/dL (02-12 @ 06:26)  Hemoglobin: 7.5 g/dL (02-11 @ 06:40)  Hemoglobin: 7.7 g/dL (02-10 @ 10:00)  Hemoglobin: 7.9 g/dL (02-09 @ 12:55)  Hemoglobin: 7.1 g/dL (02-09 @ 05:40)      02-12    137  |  109[H]  |  17  ----------------------------<  140[H]  3.7   |  23  |  0.73    Ca    9.2      12 Feb 2025 06:26  Phos  3.9     02-12  Mg     1.9     02-12      Creatinine Trend: 0.73<--, 0.79<--, 0.94<--, 0.82<--, 0.74<--    COAGS:           T(C): 36.5 (02-12-25 @ 11:20), Max: 36.7 (02-11-25 @ 20:38)  HR: 67 (02-12-25 @ 11:20) (65 - 73)  BP: 144/63 (02-12-25 @ 11:20) (118/68 - 144/63)  RR: 18 (02-12-25 @ 11:20) (18 - 19)  SpO2: 97% (02-12-25 @ 11:20) (96% - 100%)  Wt(kg): --    I&O's Summary    12 Feb 2025 07:01  -  12 Feb 2025 14:35  --------------------------------------------------------  IN: 100 mL / OUT: 0 mL / NET: 100 mL          Gen: Appears well in NAD  HEENT:  (-)icterus (-)pallor  CV: N S1 S2 1/6 AYDIN (+)2 Pulses B/l  Resp:  Clear to ausculatation B/L, normal effort  GI: (+) BS Soft, NT, ND  Lymph:  (-)Edema, (-)obvious lymphadenopathy  Skin: Warm to touch, Normal turgor  Psych: Appropriate mood and affect      TELEMETRY: 	  sinus,      ASSESSMENT/PLAN: 	84y  Female ambulates minimally with walker, PMH multiple myeloma, HTN, hypothyroidism. Presenting after syncopal episode and with complaints of worsening bilateral lower extremity swelling ? LOC    # Severe Pulm HTN  - CTPA (-) PE   - Pulm eval  - may need outpt sleep study  - d/w daughter who is a nurse at bedside      # NEw PAF  - very brief rate controlled episodes  - oupt f/u with Dr. Ramos for holter, may need beta blocker if episodes become rapid or more frequent   - Start AC if ok with heme onc  - NOAC which ever is covered by insurance       # ? Syncope  - EKG located, not in MUSE as it was hand labeled.  Sinus 66 BPM, LVH   - no pertinent findings on tele thus far  - narrow QRS is reassuring     # Multiple myeloma  - heme f/u    Lionel Zuleta MD, [facility]  BEEPER [phone]

## 2025-02-12 NOTE — PROGRESS NOTE ADULT - REASON FOR ADMISSION
syncope, lower extremity swelling

## 2025-02-13 RX ORDER — PREDNISONE 5 MG/1
1 TABLET ORAL
Qty: 30 | Refills: 0
Start: 2025-02-13 | End: 2025-03-14

## 2025-02-13 RX ORDER — OMEPRAZOLE 20 MG/1
1 CAPSULE, DELAYED RELEASE ORAL
Qty: 30 | Refills: 0
Start: 2025-02-13 | End: 2025-03-14

## 2025-02-13 RX ORDER — NALOXONE HYDROCHLORIDE 3 MG/.1ML
1 SPRAY NASAL
Qty: 2 | Refills: 0
Start: 2025-02-13 | End: 2025-02-13

## 2025-02-13 RX ORDER — PREDNISONE 5 MG/1
1 TABLET ORAL
Refills: 0 | DISCHARGE

## 2025-02-13 RX ORDER — OXYCODONE HYDROCHLORIDE 30 MG/1
1 TABLET ORAL
Qty: 9 | Refills: 0
Start: 2025-02-13 | End: 2025-02-15

## 2025-02-13 RX ORDER — OMEPRAZOLE 20 MG/1
1 CAPSULE, DELAYED RELEASE ORAL
Refills: 0 | DISCHARGE

## 2025-03-01 ENCOUNTER — INPATIENT (INPATIENT)
Facility: HOSPITAL | Age: 85
LOS: 3 days | Discharge: NURSING, REHAB OR LONG-TERM CARE | DRG: 556 | End: 2025-03-05
Attending: STUDENT IN AN ORGANIZED HEALTH CARE EDUCATION/TRAINING PROGRAM | Admitting: STUDENT IN AN ORGANIZED HEALTH CARE EDUCATION/TRAINING PROGRAM
Payer: MEDICARE

## 2025-03-01 VITALS
WEIGHT: 179.9 LBS | SYSTOLIC BLOOD PRESSURE: 110 MMHG | OXYGEN SATURATION: 95 % | TEMPERATURE: 97 F | HEART RATE: 90 BPM | DIASTOLIC BLOOD PRESSURE: 58 MMHG | HEIGHT: 65 IN | RESPIRATION RATE: 18 BRPM

## 2025-03-01 DIAGNOSIS — Z98.890 OTHER SPECIFIED POSTPROCEDURAL STATES: Chronic | ICD-10-CM

## 2025-03-01 LAB
ALBUMIN SERPL ELPH-MCNC: 2.3 G/DL — LOW (ref 3.5–5)
ALP SERPL-CCNC: 90 U/L — SIGNIFICANT CHANGE UP (ref 40–120)
ALT FLD-CCNC: 16 U/L DA — SIGNIFICANT CHANGE UP (ref 10–60)
ANION GAP SERPL CALC-SCNC: 5 MMOL/L — SIGNIFICANT CHANGE UP (ref 5–17)
ANISOCYTOSIS BLD QL: SLIGHT — SIGNIFICANT CHANGE UP
APTT BLD: 26.6 SEC — SIGNIFICANT CHANGE UP (ref 24.5–35.6)
AST SERPL-CCNC: 36 U/L — SIGNIFICANT CHANGE UP (ref 10–40)
BASOPHILS # BLD AUTO: 0 K/UL — SIGNIFICANT CHANGE UP (ref 0–0.2)
BASOPHILS NFR BLD AUTO: 0 % — SIGNIFICANT CHANGE UP (ref 0–2)
BILIRUB SERPL-MCNC: 0.5 MG/DL — SIGNIFICANT CHANGE UP (ref 0.2–1.2)
BUN SERPL-MCNC: 16 MG/DL — SIGNIFICANT CHANGE UP (ref 7–18)
CALCIUM SERPL-MCNC: 12.6 MG/DL — HIGH (ref 8.4–10.5)
CHLORIDE SERPL-SCNC: 102 MMOL/L — SIGNIFICANT CHANGE UP (ref 96–108)
CO2 SERPL-SCNC: 29 MMOL/L — SIGNIFICANT CHANGE UP (ref 22–31)
CREAT SERPL-MCNC: 1.21 MG/DL — SIGNIFICANT CHANGE UP (ref 0.5–1.3)
EGFR: 45 ML/MIN/1.73M2 — LOW
EGFR: 45 ML/MIN/1.73M2 — LOW
EOSINOPHIL # BLD AUTO: 0.14 K/UL — SIGNIFICANT CHANGE UP (ref 0–0.5)
EOSINOPHIL NFR BLD AUTO: 3 % — SIGNIFICANT CHANGE UP (ref 0–6)
GLUCOSE SERPL-MCNC: 120 MG/DL — HIGH (ref 70–99)
HCT VFR BLD CALC: 26.2 % — LOW (ref 34.5–45)
HGB BLD-MCNC: 7.6 G/DL — LOW (ref 11.5–15.5)
INR BLD: 1.41 RATIO — HIGH (ref 0.85–1.16)
LACTATE SERPL-SCNC: 2.2 MMOL/L — HIGH (ref 0.7–2)
LIDOCAIN IGE QN: 19 U/L — SIGNIFICANT CHANGE UP (ref 13–75)
LYMPHOCYTES # BLD AUTO: 1.21 K/UL — SIGNIFICANT CHANGE UP (ref 1–3.3)
LYMPHOCYTES # BLD AUTO: 26 % — SIGNIFICANT CHANGE UP (ref 13–44)
MANUAL SMEAR VERIFICATION: SIGNIFICANT CHANGE UP
MCHC RBC-ENTMCNC: 26.1 PG — LOW (ref 27–34)
MCHC RBC-ENTMCNC: 29 G/DL — LOW (ref 32–36)
MCV RBC AUTO: 90 FL — SIGNIFICANT CHANGE UP (ref 80–100)
MONOCYTES # BLD AUTO: 0.33 K/UL — SIGNIFICANT CHANGE UP (ref 0–0.9)
MONOCYTES NFR BLD AUTO: 7 % — SIGNIFICANT CHANGE UP (ref 2–14)
NEUTROPHILS # BLD AUTO: 2.94 K/UL — SIGNIFICANT CHANGE UP (ref 1.8–7.4)
NEUTROPHILS NFR BLD AUTO: 63 % — SIGNIFICANT CHANGE UP (ref 43–77)
NRBC # BLD: 0 /100 WBCS — SIGNIFICANT CHANGE UP (ref 0–0)
NRBC BLD-RTO: 0 /100 WBCS — SIGNIFICANT CHANGE UP (ref 0–0)
OVALOCYTES BLD QL SMEAR: SLIGHT — SIGNIFICANT CHANGE UP
PLAT MORPH BLD: NORMAL — SIGNIFICANT CHANGE UP
PLATELET # BLD AUTO: 147 K/UL — LOW (ref 150–400)
PLATELET COUNT - ESTIMATE: NORMAL — SIGNIFICANT CHANGE UP
POIKILOCYTOSIS BLD QL AUTO: SLIGHT — SIGNIFICANT CHANGE UP
POTASSIUM SERPL-MCNC: 3.3 MMOL/L — LOW (ref 3.5–5.3)
POTASSIUM SERPL-SCNC: 3.3 MMOL/L — LOW (ref 3.5–5.3)
PROT SERPL-MCNC: 8 G/DL — SIGNIFICANT CHANGE UP (ref 6–8.3)
PROTHROM AB SERPL-ACNC: 16.3 SEC — HIGH (ref 9.9–13.4)
RBC # BLD: 2.91 M/UL — LOW (ref 3.8–5.2)
RBC # FLD: 22 % — HIGH (ref 10.3–14.5)
RBC BLD AUTO: ABNORMAL
SODIUM SERPL-SCNC: 136 MMOL/L — SIGNIFICANT CHANGE UP (ref 135–145)
STOMATOCYTES BLD QL SMEAR: SLIGHT — SIGNIFICANT CHANGE UP
TROPONIN I, HIGH SENSITIVITY RESULT: 25.6 NG/L — SIGNIFICANT CHANGE UP
VARIANT LYMPHS # BLD: 1 % — SIGNIFICANT CHANGE UP (ref 0–6)
VARIANT LYMPHS NFR BLD MANUAL: 1 % — SIGNIFICANT CHANGE UP (ref 0–6)
WBC # BLD: 4.67 K/UL — SIGNIFICANT CHANGE UP (ref 3.8–10.5)
WBC # FLD AUTO: 4.67 K/UL — SIGNIFICANT CHANGE UP (ref 3.8–10.5)

## 2025-03-01 PROCEDURE — 74177 CT ABD & PELVIS W/CONTRAST: CPT | Mod: 26

## 2025-03-01 PROCEDURE — 72125 CT NECK SPINE W/O DYE: CPT | Mod: 26

## 2025-03-01 PROCEDURE — 70450 CT HEAD/BRAIN W/O DYE: CPT | Mod: 26

## 2025-03-01 PROCEDURE — 71260 CT THORAX DX C+: CPT | Mod: 26

## 2025-03-01 PROCEDURE — 99285 EMERGENCY DEPT VISIT HI MDM: CPT

## 2025-03-01 RX ORDER — ACETAMINOPHEN 500 MG/5ML
650 LIQUID (ML) ORAL ONCE
Refills: 0 | Status: COMPLETED | OUTPATIENT
Start: 2025-03-01 | End: 2025-03-01

## 2025-03-01 RX ORDER — LIDOCAINE HYDROCHLORIDE 20 MG/ML
1 JELLY TOPICAL ONCE
Refills: 0 | Status: COMPLETED | OUTPATIENT
Start: 2025-03-01 | End: 2025-03-01

## 2025-03-01 RX ORDER — CYCLOBENZAPRINE HYDROCHLORIDE 15 MG/1
10 CAPSULE, EXTENDED RELEASE ORAL ONCE
Refills: 0 | Status: COMPLETED | OUTPATIENT
Start: 2025-03-01 | End: 2025-03-01

## 2025-03-01 RX ADMIN — Medication 650 MILLIGRAM(S): at 19:43

## 2025-03-01 RX ADMIN — Medication 650 MILLIGRAM(S): at 20:13

## 2025-03-01 RX ADMIN — CYCLOBENZAPRINE HYDROCHLORIDE 10 MILLIGRAM(S): 15 CAPSULE, EXTENDED RELEASE ORAL at 19:43

## 2025-03-01 NOTE — ED PROVIDER NOTE - OBJECTIVE STATEMENT
Patient is a 84-year-old female from home, ambulates minimally with walker, DNR/DNI with past medical history multiple myeloma, HTN, hypothyroidism, A-fib on Eliquis Brought in by EMS with bilateral rib pain s/p mechanical fall.  As per EMS and patient daughter, patient had a mechanical fall at home, landing onto her left side.  Denies Headstrike, LOC. Patient endorsing acute on chronic bilateral rib pain.  Patient was admitted to the hospital recently for syncope and lower extremity swelling, her chest x-ray showed small bilateral pleural effusions and cardiomegaly, CT scans showed expansile lytic lesions involving the ribs spine and pelvic bones consistent with multiple myeloma.  Currently oriented to person, place not time or situation, following commands, moving extremities equally.  As per patient daughter, patient has been confused over the last 4 to 5 weeks.  Patient has minimal help at home and patient daughter hired a private home health aide.  Patient daughter requesting social work evaluation for HHA hours.

## 2025-03-01 NOTE — ED ADULT TRIAGE NOTE - CHIEF COMPLAINT QUOTE
s/p fall x today, denies any pain (chronic yuliana rib pain), no LOC, pt on Eliquis, sent by daughter for eval from fall

## 2025-03-01 NOTE — ED PROVIDER NOTE - CARE PLAN
Principal Discharge DX:	Ambulatory dysfunction  Secondary Diagnosis:	Frequent falls   1 Principal Discharge DX:	Ambulatory dysfunction  Secondary Diagnosis:	Frequent falls  Secondary Diagnosis:	Pathologic rib fracture  Secondary Diagnosis:	Urinary tract infection

## 2025-03-01 NOTE — CHART NOTE - NSCHARTNOTEFT_GEN_A_CORE
Consult : Safe D/c    Pt is an 84 yr old female who was brought to the ED by EMS post fall. Devin outreached Pt's daughter, Capri Brumfield at  re consult     Devin introduced self and purpose of call and supportive role explained. Daughter reported Pt was discharged 2wks ago and she has been private hiring HHA but Pt fell today. She also said she was promised home PT for Pt but no one has called or visited Pt.     She shared that Pt's health is declining and she has been falling frequently lately., therefore she is requesting [facility] placement for Pt.     Devin explained what the process entails and she verbalized understanding. Emotional support offered.     Physician updated. Consult : Safe D/c    Pt is an 84 yr old female who was brought to the ED by EMS post fall. Devin outreached Pt's daughter, Capri Brumfield at  re consult     Devin introduced self, purpose of call and supportive role explained. Daughter reported Pt was discharged 2wks ago and she has been private hiring HHA since her d/c but Pt fell today. She also said she was promised home PT for Pt but no one has called or visited Pt.     She shared that Pt's health is declining and she has been falling frequently lately and that is a big concern for her / family. Therefore,  she is requesting [facility] placement for Pt.     Devin explained what the process entails and she verbalized understanding. Emotional support offered.     Physician updated. Consult : Safe D/c    Pt is an 84 yr old female who was brought to the ED by EMS post fall. Devin outreached Pt's daughter, Capri Brumfield at  re consult     Devin introduced self, purpose of call and supportive role explained. Daughter reported Pt was discharged 2wks ago and she has been private hiring HHA since her d/c but Pt fell today. She also said she was promised home PT for Pt but no one has called or visited Pt.     She shared that Pt's health is declining, she has been having frequent falls and that is a big concern for her / family. Daughter is requesting [facility] placement for Pt.     Devin explained what the process entails and she verbalized understanding. Emotional support offered.     Physician updated.

## 2025-03-01 NOTE — ED ADULT NURSE NOTE - ED STAT RN HANDOFF DETAILS
pt admiteed /report given to rn/say /vitals checked and recorded /safety maintained /kept the pt clean and dry

## 2025-03-01 NOTE — ED PROVIDER NOTE - CLINICAL SUMMARY MEDICAL DECISION MAKING FREE TEXT BOX
Patient is a 84-year-old female from home, ambulates minimally with walker, DNR/DNI with past medical history multiple myeloma, HTN, hypothyroidism, A-fib on Eliquis Brought in by EMS with bilateral rib pain s/p mechanical fall.  Patient was admitted to the hospital recently for syncope and lower extremity swelling, her chest x-ray showed small bilateral pleural effusions and cardiomegaly, CT scans showed expansile lytic lesions involving the ribs spine and pelvic bones consistent with multiple myeloma.  Currently oriented to person, place not time or situation, following commands, moving extremities equally.  VSS. GCS 14. No external signs of trauma.  - Will treat pain, check labs and rule out electrolyte abnormalities and blood loss anemia, ekg troponin to assess for acs vs arrythmia, CT head and spine to rule out acute bleed vs fracture, CT C/A/P to rule out rib fractures versus internal organ injury, reassess, continue to monitor. Patient is a 84-year-old female from home, ambulates minimally with walker, DNR/DNI with past medical history multiple myeloma, HTN, hypothyroidism, A-fib on Eliquis Brought in by EMS with bilateral rib pain s/p mechanical fall.  Patient was admitted to the hospital recently for syncope and lower extremity swelling, her chest x-ray showed small bilateral pleural effusions and cardiomegaly, CT scans showed expansile lytic lesions involving the ribs spine and pelvic bones consistent with multiple myeloma.  Currently oriented to person, place not time or situation, following commands, moving extremities equally.  VSS. GCS 14. No external signs of trauma.  - Will treat pain, check labs and rule out electrolyte abnormalities and blood loss anemia, ekg troponin to assess for acs vs arrythmia, CT head and spine to rule out acute bleed vs fracture, CT C/A/P to rule out rib fractures versus internal organ injury, reassess, continue to monitor.    CT with motion artifact associated pain medicine given and CT  repeated.  Radiologist read fifth and seventh rib fractures possibly 6 rib fractures discussed with patient's daughter which she would like to transfer to [place] for observation for potential pneumothorax however her daughter states given there is no pneumothorax and she is older and has pathologic rib fractures in the past there is no need for this at this time

## 2025-03-01 NOTE — ED ADULT NURSE NOTE - OBJECTIVE STATEMENT
Patient is a 83y/o female presenting to the ED c/o s/p fall x today, denies any pain (chronic yuliana rib pain), no LOC, pt on Eliquis, sent by daughter for eval from fall

## 2025-03-01 NOTE — ED PROVIDER NOTE - PHYSICAL EXAMINATION
Gen: no acute distress  Head: normocephalic  Lung: CTAB, no respiratory distress, no wheezing, rales, rhonchi  CV: normal s1/s2, rrr,   Abd: soft, Tender to palpation in left upper rib, no step-offs no clicking no obvious deformities no rebound no guarding no peritoneal sign  MSK: full range of motion in all 4 extremities  Neuro: Alert and oriented to person place, not time or situation, following commands, moving extremities equally, confused from baseline for 5 weeks

## 2025-03-01 NOTE — ED PROVIDER NOTE - PROGRESS NOTE DETAILS
Labs significant for mild lactic acidosis.  IV fluids running, repeat pending.  Vital signs stable resting comfortably.  CT read pending.  After discussion with patient daughter and social work at bedside, patient daughter states that patient was promised home PT but nobody called for PT, patient's health is declining and she is having frequent falls which is a huge concern to family.  Patient daughter requesting RAJINDER placement.  Will likely admit to medicine For ambulatory dysfunction and placement pending CT results. Nayan Garnett MD.

## 2025-03-02 DIAGNOSIS — I48.91 UNSPECIFIED ATRIAL FIBRILLATION: ICD-10-CM

## 2025-03-02 DIAGNOSIS — C90.00 MULTIPLE MYELOMA NOT HAVING ACHIEVED REMISSION: ICD-10-CM

## 2025-03-02 DIAGNOSIS — E83.52 HYPERCALCEMIA: ICD-10-CM

## 2025-03-02 DIAGNOSIS — N39.0 URINARY TRACT INFECTION, SITE NOT SPECIFIED: ICD-10-CM

## 2025-03-02 DIAGNOSIS — N17.9 ACUTE KIDNEY FAILURE, UNSPECIFIED: ICD-10-CM
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
## 2025-03-02 DIAGNOSIS — I10 ESSENTIAL (PRIMARY) HYPERTENSION: ICD-10-CM

## 2025-03-02 DIAGNOSIS — Z29.9 ENCOUNTER FOR PROPHYLACTIC MEASURES, UNSPECIFIED: ICD-10-CM

## 2025-03-02 DIAGNOSIS — R26.2 DIFFICULTY IN WALKING, NOT ELSEWHERE CLASSIFIED: ICD-10-CM

## 2025-03-02 DIAGNOSIS — E03.9 HYPOTHYROIDISM, UNSPECIFIED: ICD-10-CM

## 2025-03-02 LAB
ALBUMIN SERPL ELPH-MCNC: 2.3 G/DL — LOW (ref 3.5–5)
ALP SERPL-CCNC: 90 U/L — SIGNIFICANT CHANGE UP (ref 40–120)
ALT FLD-CCNC: 11 U/L DA — SIGNIFICANT CHANGE UP (ref 10–60)
ANION GAP SERPL CALC-SCNC: 4 MMOL/L — LOW (ref 5–17)
ANION GAP SERPL CALC-SCNC: 6 MMOL/L — SIGNIFICANT CHANGE UP (ref 5–17)
APPEARANCE UR: ABNORMAL
AST SERPL-CCNC: 8 U/L — LOW (ref 10–40)
BACTERIA # UR AUTO: ABNORMAL /HPF
BASOPHILS # BLD AUTO: 0.01 K/UL — SIGNIFICANT CHANGE UP (ref 0–0.2)
BASOPHILS NFR BLD AUTO: 0.3 % — SIGNIFICANT CHANGE UP (ref 0–2)
BILIRUB SERPL-MCNC: 0.3 MG/DL — SIGNIFICANT CHANGE UP (ref 0.2–1.2)
BILIRUB UR-MCNC: NEGATIVE — SIGNIFICANT CHANGE UP
BUN SERPL-MCNC: 17 MG/DL — SIGNIFICANT CHANGE UP (ref 7–18)
BUN SERPL-MCNC: 18 MG/DL — SIGNIFICANT CHANGE UP (ref 7–18)
CALCIUM SERPL-MCNC: 12.4 MG/DL — HIGH (ref 8.4–10.5)
CALCIUM SERPL-MCNC: 12.9 MG/DL — HIGH (ref 8.4–10.5)
CHLORIDE SERPL-SCNC: 108 MMOL/L — SIGNIFICANT CHANGE UP (ref 96–108)
CHLORIDE SERPL-SCNC: 110 MMOL/L — HIGH (ref 96–108)
CO2 SERPL-SCNC: 27 MMOL/L — SIGNIFICANT CHANGE UP (ref 22–31)
CO2 SERPL-SCNC: 30 MMOL/L — SIGNIFICANT CHANGE UP (ref 22–31)
COLOR SPEC: YELLOW — SIGNIFICANT CHANGE UP
COMMENT - URINE: SIGNIFICANT CHANGE UP
CREAT SERPL-MCNC: 1.18 MG/DL — SIGNIFICANT CHANGE UP (ref 0.5–1.3)
CREAT SERPL-MCNC: 1.24 MG/DL — SIGNIFICANT CHANGE UP (ref 0.5–1.3)
DIFF PNL FLD: NEGATIVE — SIGNIFICANT CHANGE UP
EGFR: 43 ML/MIN/1.73M2 — LOW
EGFR: 43 ML/MIN/1.73M2 — LOW
EGFR: 46 ML/MIN/1.73M2 — LOW
EGFR: 46 ML/MIN/1.73M2 — LOW
EOSINOPHIL # BLD AUTO: 0.23 K/UL — SIGNIFICANT CHANGE UP (ref 0–0.5)
EOSINOPHIL NFR BLD AUTO: 6.6 % — HIGH (ref 0–6)
EPI CELLS # UR: PRESENT
GLUCOSE SERPL-MCNC: 123 MG/DL — HIGH (ref 70–99)
GLUCOSE SERPL-MCNC: 138 MG/DL — HIGH (ref 70–99)
GLUCOSE UR QL: NEGATIVE MG/DL — SIGNIFICANT CHANGE UP
HCT VFR BLD CALC: 27.6 % — LOW (ref 34.5–45)
HGB BLD-MCNC: 7.8 G/DL — LOW (ref 11.5–15.5)
IMM GRANULOCYTES NFR BLD AUTO: 1.1 % — HIGH (ref 0–0.9)
KETONES UR-MCNC: NEGATIVE MG/DL — SIGNIFICANT CHANGE UP
LACTATE SERPL-SCNC: 1.6 MMOL/L — SIGNIFICANT CHANGE UP (ref 0.7–2)
LEUKOCYTE ESTERASE UR-ACNC: ABNORMAL
LYMPHOCYTES # BLD AUTO: 0.91 K/UL — LOW (ref 1–3.3)
LYMPHOCYTES # BLD AUTO: 25.9 % — SIGNIFICANT CHANGE UP (ref 13–44)
MAGNESIUM SERPL-MCNC: 1.8 MG/DL — SIGNIFICANT CHANGE UP (ref 1.6–2.6)
MAGNESIUM SERPL-MCNC: 2 MG/DL — SIGNIFICANT CHANGE UP (ref 1.6–2.6)
MCHC RBC-ENTMCNC: 25.5 PG — LOW (ref 27–34)
MCHC RBC-ENTMCNC: 28.3 G/DL — LOW (ref 32–36)
MCV RBC AUTO: 90.2 FL — SIGNIFICANT CHANGE UP (ref 80–100)
MONOCYTES # BLD AUTO: 0.47 K/UL — SIGNIFICANT CHANGE UP (ref 0–0.9)
MONOCYTES NFR BLD AUTO: 13.4 % — SIGNIFICANT CHANGE UP (ref 2–14)
NEUTROPHILS # BLD AUTO: 1.85 K/UL — SIGNIFICANT CHANGE UP (ref 1.8–7.4)
NEUTROPHILS NFR BLD AUTO: 52.7 % — SIGNIFICANT CHANGE UP (ref 43–77)
NITRITE UR-MCNC: POSITIVE
NRBC BLD AUTO-RTO: 0 /100 WBCS — SIGNIFICANT CHANGE UP (ref 0–0)
PH UR: 7.5 — SIGNIFICANT CHANGE UP (ref 5–8)
PHOSPHATE SERPL-MCNC: 3.6 MG/DL — SIGNIFICANT CHANGE UP (ref 2.5–4.5)
PHOSPHATE SERPL-MCNC: 4.1 MG/DL — SIGNIFICANT CHANGE UP (ref 2.5–4.5)
PLATELET # BLD AUTO: 134 K/UL — LOW (ref 150–400)
POTASSIUM SERPL-MCNC: 2.7 MMOL/L — CRITICAL LOW (ref 3.5–5.3)
POTASSIUM SERPL-MCNC: 2.9 MMOL/L — CRITICAL LOW (ref 3.5–5.3)
POTASSIUM SERPL-SCNC: 2.7 MMOL/L — CRITICAL LOW (ref 3.5–5.3)
POTASSIUM SERPL-SCNC: 2.9 MMOL/L — CRITICAL LOW (ref 3.5–5.3)
PROT SERPL-MCNC: 7.7 G/DL — SIGNIFICANT CHANGE UP (ref 6–8.3)
PROT UR-MCNC: NEGATIVE MG/DL — SIGNIFICANT CHANGE UP
RBC # BLD: 3.06 M/UL — LOW (ref 3.8–5.2)
RBC # FLD: 22.5 % — HIGH (ref 10.3–14.5)
RBC CASTS # UR COMP ASSIST: 0 /HPF — SIGNIFICANT CHANGE UP (ref 0–4)
SODIUM SERPL-SCNC: 141 MMOL/L — SIGNIFICANT CHANGE UP (ref 135–145)
SODIUM SERPL-SCNC: 144 MMOL/L — SIGNIFICANT CHANGE UP (ref 135–145)
SP GR SPEC: 1.01 — SIGNIFICANT CHANGE UP (ref 1–1.03)
UROBILINOGEN FLD QL: 0.2 MG/DL — SIGNIFICANT CHANGE UP (ref 0.2–1)
WBC # BLD: 3.51 K/UL — LOW (ref 3.8–10.5)
WBC # FLD AUTO: 3.51 K/UL — LOW (ref 3.8–10.5)
WBC UR QL: 4 /HPF — SIGNIFICANT CHANGE UP (ref 0–5)

## 2025-03-02 PROCEDURE — 71250 CT THORAX DX C-: CPT | Mod: 26

## 2025-03-02 PROCEDURE — 99222 1ST HOSP IP/OBS MODERATE 55: CPT | Mod: GC

## 2025-03-02 PROCEDURE — 99497 ADVNCD CARE PLAN 30 MIN: CPT

## 2025-03-02 RX ORDER — LIDOCAINE HYDROCHLORIDE 20 MG/ML
1 JELLY TOPICAL DAILY
Refills: 0 | Status: DISCONTINUED | OUTPATIENT
Start: 2025-03-02 | End: 2025-03-05

## 2025-03-02 RX ORDER — CEFTRIAXONE 500 MG/1
1000 INJECTION, POWDER, FOR SOLUTION INTRAMUSCULAR; INTRAVENOUS EVERY 24 HOURS
Refills: 0 | Status: COMPLETED | OUTPATIENT
Start: 2025-03-03 | End: 2025-03-04

## 2025-03-02 RX ORDER — ZOLEDRONIC ACID 0.05 MG/ML
3.3 INJECTION, SOLUTION INTRAVENOUS ONCE
Refills: 0 | Status: DISCONTINUED | OUTPATIENT
Start: 2025-03-02 | End: 2025-03-02

## 2025-03-02 RX ORDER — POLYETHYLENE GLYCOL 3350 17 G/17G
17 POWDER, FOR SOLUTION ORAL DAILY
Refills: 0 | Status: DISCONTINUED | OUTPATIENT
Start: 2025-03-02 | End: 2025-03-05

## 2025-03-02 RX ORDER — OXYCODONE HYDROCHLORIDE 30 MG/1
5 TABLET ORAL EVERY 8 HOURS
Refills: 0 | Status: DISCONTINUED | OUTPATIENT
Start: 2025-03-02 | End: 2025-03-03

## 2025-03-02 RX ORDER — CEFTRIAXONE 500 MG/1
1000 INJECTION, POWDER, FOR SOLUTION INTRAMUSCULAR; INTRAVENOUS ONCE
Refills: 0 | Status: COMPLETED | OUTPATIENT
Start: 2025-03-02 | End: 2025-03-02

## 2025-03-02 RX ORDER — LEVOTHYROXINE SODIUM 300 MCG
88 TABLET ORAL DAILY
Refills: 0 | Status: DISCONTINUED | OUTPATIENT
Start: 2025-03-02 | End: 2025-03-04

## 2025-03-02 RX ORDER — APIXABAN 2.5 MG/1
5 TABLET, FILM COATED ORAL
Refills: 0 | Status: DISCONTINUED | OUTPATIENT
Start: 2025-03-02 | End: 2025-03-04

## 2025-03-02 RX ORDER — ONDANSETRON HCL/PF 4 MG/2 ML
4 VIAL (ML) INJECTION EVERY 8 HOURS
Refills: 0 | Status: DISCONTINUED | OUTPATIENT
Start: 2025-03-02 | End: 2025-03-05

## 2025-03-02 RX ORDER — ACETAMINOPHEN 500 MG/5ML
650 LIQUID (ML) ORAL EVERY 6 HOURS
Refills: 0 | Status: DISCONTINUED | OUTPATIENT
Start: 2025-03-02 | End: 2025-03-05

## 2025-03-02 RX ORDER — MAGNESIUM, ALUMINUM HYDROXIDE 200-200 MG
30 TABLET,CHEWABLE ORAL EVERY 4 HOURS
Refills: 0 | Status: DISCONTINUED | OUTPATIENT
Start: 2025-03-02 | End: 2025-03-02

## 2025-03-02 RX ORDER — LIDOCAINE HYDROCHLORIDE 20 MG/ML
2 JELLY TOPICAL ONCE
Refills: 0 | Status: COMPLETED | OUTPATIENT
Start: 2025-03-02 | End: 2025-03-02

## 2025-03-02 RX ORDER — ZOLEDRONIC ACID 0.05 MG/ML
3.3 INJECTION, SOLUTION INTRAVENOUS ONCE
Refills: 0 | Status: COMPLETED | OUTPATIENT
Start: 2025-03-02 | End: 2025-03-02

## 2025-03-02 RX ORDER — MELATONIN 5 MG
3 TABLET ORAL AT BEDTIME
Refills: 0 | Status: DISCONTINUED | OUTPATIENT
Start: 2025-03-02 | End: 2025-03-05

## 2025-03-02 RX ORDER — PREDNISONE 20 MG/1
20 TABLET ORAL DAILY
Refills: 0 | Status: DISCONTINUED | OUTPATIENT
Start: 2025-03-02 | End: 2025-03-05

## 2025-03-02 RX ADMIN — Medication 100 MILLILITER(S): at 15:42

## 2025-03-02 RX ADMIN — Medication 100 MILLIEQUIVALENT(S): at 14:32

## 2025-03-02 RX ADMIN — Medication 40 MILLIEQUIVALENT(S): at 05:08

## 2025-03-02 RX ADMIN — LIDOCAINE HYDROCHLORIDE 2 PATCH: 20 JELLY TOPICAL at 02:56

## 2025-03-02 RX ADMIN — Medication 40 MILLIGRAM(S): at 08:54

## 2025-03-02 RX ADMIN — ZOLEDRONIC ACID 3.3 MILLIGRAM(S): 0.05 INJECTION, SOLUTION INTRAVENOUS at 08:54

## 2025-03-02 RX ADMIN — Medication 100 MILLILITER(S): at 12:06

## 2025-03-02 RX ADMIN — Medication 100 MILLIEQUIVALENT(S): at 18:56

## 2025-03-02 RX ADMIN — Medication 100 MILLIEQUIVALENT(S): at 13:11

## 2025-03-02 RX ADMIN — Medication 4 MILLIGRAM(S): at 01:56

## 2025-03-02 RX ADMIN — Medication 40 MILLIEQUIVALENT(S): at 12:06

## 2025-03-02 RX ADMIN — Medication 40 MILLIEQUIVALENT(S): at 15:40

## 2025-03-02 RX ADMIN — Medication 1000 MILLILITER(S): at 00:08

## 2025-03-02 RX ADMIN — Medication 40 MILLIEQUIVALENT(S): at 20:59

## 2025-03-02 RX ADMIN — CEFTRIAXONE 100 MILLIGRAM(S): 500 INJECTION, POWDER, FOR SOLUTION INTRAMUSCULAR; INTRAVENOUS at 05:08

## 2025-03-02 RX ADMIN — POLYETHYLENE GLYCOL 3350 17 GRAM(S): 17 POWDER, FOR SOLUTION ORAL at 12:07

## 2025-03-02 RX ADMIN — Medication 100 MILLIEQUIVALENT(S): at 20:59

## 2025-03-02 RX ADMIN — LIDOCAINE HYDROCHLORIDE 2 PATCH: 20 JELLY TOPICAL at 13:08

## 2025-03-02 RX ADMIN — Medication 100 MILLIEQUIVALENT(S): at 12:06

## 2025-03-02 RX ADMIN — Medication 100 MILLIEQUIVALENT(S): at 19:55

## 2025-03-02 RX ADMIN — PREDNISONE 20 MILLIGRAM(S): 20 TABLET ORAL at 15:40

## 2025-03-02 RX ADMIN — APIXABAN 5 MILLIGRAM(S): 2.5 TABLET, FILM COATED ORAL at 17:28

## 2025-03-02 NOTE — H&P ADULT - PROBLEM SELECTOR PLAN 1
hx of recent falls  presented after unwitnessed mechanical fall from bed  found on floor 1 hour after incident  denies hitting of head, LOC, seizure, dyspnea chest pain, palpitations, visual changes, prior or after the fall  CTH negative for acute pathology  f/u PT eval  fall precautions hx of recent falls  presented after unwitnessed mechanical fall from bed  found on floor 1 hour after incident  denies hitting of head, LOC, seizure, dyspnea chest pain, palpitations, visual changes, prior or after the fall  CTH negative for acute pathology  f/u PT eval  fall precautions  hold Gabapentin 300 TID iso lethargy

## 2025-03-02 NOTE — H&P ADULT - PROBLEM SELECTOR PLAN 5
Corrected Ca 13.9 iso MM  s/p 1L IVF bolus & zoledronic acid x1  started maintenance IVF  monitor CMP

## 2025-03-02 NOTE — H&P ADULT - ATTENDING COMMENTS
EKG  Atrial Fibrillation, 90 bpm, QTc 435ms, on my read no ST segment elevation or depression, TWI in V1, aVR    HPI  84 year old female patient with pmhx multiple myeloma, HTN, hypothyroidism who presented to the ER due to a fall.  The patient was sitting on the bed and fell trying to reach for a chair. No loss of consciousness and no head injury. The patient has been more confused for the past month. At home the daughter hired a private HHA but would like help from social work for more HHA hours. The patient was also recently discharged with home PT but nobody has come to their home for the home PT yet.    Physical Exam  General: Awake, Lethargic, Oriented x2, Dry Oral Mucosa  Cardiac: Irregular Rhythm, Regular Rate  Pulmonary: CTA b/l, + left rib pain  Abdominal: Soft, ND, NT  Extremities: No edema b/l, left upper extremity soft with no signs of symptoms of compartment syndrome    A/P  # Hypercalcemia Likely 2/2 Lytic lesions and Multiple Myeloma  # Dehydration  # Hx of Muliple Myeloma  > Corrected calcium of 14 with lethargy and history of increased confusion, will give 1 dose of IV Zoledronic acid renally dosed  - Consult Hem/Onc  - 1 dose IV Zoledronic acid renally dosed  - IV Fluid Hydration  - Follow up morning Serum Calcium    # Fall  - PT evaluation  - Fall Precautions    # Social Work Problem  - Consult  to see if patient can have more HHA hours    # UTI  - IV Ceftriaxone  - Follow up urine culture    # MEHRAN  > Creatinine of 1.21 in the ER; was 0.73 on 2/12/2025  - IV Fluid Hydration    # Hypokalemia  - Replete K  - Follow up morning Serum K    # Hx of multiple myeloma, HTN, hypothyroidism  - Continue home medications PPI, Oxycodone prn, Miralax, Eliquis, Synthroid, Prednisone  - Hold home medications Amlodipine, Losartan due to soft blood pressure  - Hold home medication Gabapentin due to lethargy    # DVT PPx  - Eliquis    # FEN  - DASH Diet  - Monitor and replete electrolytes as needed  - IV Fluid Hydration  - Aspiration Precautions  - Fall Precautions    Previous Admissions Included  2/8/2025 - 2/12/2025: Syncope, TTE performed, suspected PH with sPAP 80mmHg. Patient with atrial fibrillation on telemetry, started on FD Lovenox. Transitioned to Eliquis prior to discharge.  4/15/2018 - 4/16/2018: ACE inhibitor-induced angioedema. Questionable allergy to chocolate (when she eats specific "david" chocolate gets small rash) but denies personal or family hx of angioedema. Has been on enalipril for many years. avoid enalapril; continue atenolol, start amlodipine if BP prolonged >130/80    Patient case and management was discussed with ER Attending  I did examine all labs (including CBC, PT/INR, APTT, CMP, Lactate, Troponin, Lipase, UA), imaging, prior notes

## 2025-03-02 NOTE — H&P ADULT - NSHPPHYSICALEXAM_GEN_ALL_CORE
GENERAL: NAD; Lethargic, lying in bed  HEAD:  Atraumatic, Normocephalic  EYES: EOMI, PERRLA, conjunctiva and sclera clear  ENMT: No tonsillar erythema, exudates, or enlargement; Dry mucous membranes  NECK: Supple, normal appearance, No JVD;  NERVOUS SYSTEM:  Alert & Oriented X2,  Motor Strength 4/5 B/L upper and lower extremities, sensation intact  CHEST/LUNG: Lungs clear to auscultation bilaterally, No rales, rhonchi, wheezing   HEART: Irregular rate and rhythm; No murmurs, rubs, or gallops  ABDOMEN: Soft, Nontender, Nondistended; Bowel sounds present  EXTREMITIES:  2+ Peripheral Pulses, No clubbing, cyanosis, or edema    Vital Signs Last 24 Hrs  T(C): 36.5 (01 Mar 2025 23:52), Max: 36.6 (01 Mar 2025 21:01)  T(F): 97.7 (01 Mar 2025 23:52), Max: 97.9 (01 Mar 2025 21:01)  HR: 85 (01 Mar 2025 23:52) (83 - 90)  BP: 101/54 (01 Mar 2025 23:52) (101/54 - 111/68)  BP(mean): --  RR: 18 (01 Mar 2025 23:52) (18 - 18)  SpO2: 94% (01 Mar 2025 23:52) (94% - 95%)    Parameters below as of 01 Mar 2025 23:52  Patient On (Oxygen Delivery Method): room air      SKIN: No rashes or lesions;

## 2025-03-02 NOTE — PHARMACOTHERAPY INTERVENTION NOTE - COMMENTS
Patient identified by Safe Rx for Patients 66 y/o and Older Report.     Oxycodone PRN - mechanical fall     No intervention at this time.

## 2025-03-02 NOTE — CHART NOTE - NSCHARTNOTEFT_GEN_A_CORE
Patient seen and examined. Case discussed with patient's daughter, Capri, via the phone. Communicated with patient via [place]  #[id number], Tamara. Patient appears confused but denies any complaints. Per daughter, Capri, patient's mental status has declined significantly over the course of the past 3-6 weeks. She reports that prior to this, the patient was fully functional and walked with a rolling walker. She does not feel she can care for the patient at home with her current level of HHA support. She is requesting PT evaluation and possible RAJINDER placement. Patient noted to be profoundly hypokalemic to this AM to 2.7. Repletion and repeat BMP ordered. She remains hypercalcemic to 12.9 (corrected to 14.26 for hypoalbunemia). She received Zometa already, will c/w IVF at 100cc/hr for now for uptrending calcium. On imaging, patient is noted to have lesions in the spine, pelvis, ribs, and proximal femurs. Suspect resulting progression of multiple myeloma is the cause of the patient's of patient's hypercalcemia. Will consult patient's hematologist (follows with Dr. Colon at [facility]) for further recommendations. Will also consult palliative care in the AM. Remaining care as noted in H&P written earlier in the day.

## 2025-03-02 NOTE — H&P ADULT - PROBLEM SELECTOR PLAN 2
UA mildly positive iso UA mildly positive iso increased lethargy  denies dysuria  s/p 1g CTX  f/u Ucx  c/w abx

## 2025-03-02 NOTE — PATIENT PROFILE ADULT - FUNCTIONAL ASSESSMENT - DAILY ACTIVITY 2.
Patient attended Phase 2 Cardiac Rehab Exercise Session. Further documentation will be scanned into the medical record upon discharge.     1 = Total assistance

## 2025-03-02 NOTE — GOALS OF CARE CONVERSATION - ADVANCED CARE PLANNING - CAREGIVER NAME
Chief Complaint  Back Pain (56 Stefanie Charles presents to Great River Medical Center Chencho England MD;  Location: Prisma Health Patewood ROCIO Mcduffie    Patient or patient representative verbalized consent for the use of Ambient Listening during the visit with  ROCIO Mcduffie for chart documentation. 1/7/2025  13:09 EST     Capri Brumfield

## 2025-03-02 NOTE — PATIENT PROFILE ADULT - NSPROPTRIGHTNOTIFY_GEN_A_NUR
Pt called to request to have his hydroxyzine pamoate re-sent to [facility] in [place]  The pharmacy is telling him they did not receive the script that was sent yesterday  Pt said he has none remaining and has been out for 2 days now      Routed to Dr Ni Mitchell
Rx re-sent
Spoke with pt to make him aware Rx has been re-sent
declines

## 2025-03-02 NOTE — PATIENT PROFILE ADULT - FALL HARM RISK - HARM RISK INTERVENTIONS
Assistance with ambulation/Assistance OOB with selected safe patient handling equipment/Communicate Risk of Fall with Harm to all staff/Discuss with provider need for PT consult/Monitor gait and stability/Reinforce activity limits and safety measures with patient and family/Tailored Fall Risk Interventions/Visual Cue: Yellow wristband and red socks/Bed in lowest position, wheels locked, appropriate side rails in place/Call bell, personal items and telephone in reach/Instruct patient to call for assistance before getting out of bed or chair/Non-slip footwear when patient is out of bed/[place] to call system/Physically safe environment - no spills, clutter or unnecessary equipment/Purposeful Proactive Rounding/Room/bathroom lighting operational, light cord in reach

## 2025-03-02 NOTE — H&P ADULT - NSHPSOCIALHISTORY_GEN_ALL_CORE
85 yo F lives at home alone, minimally ambulates with walker. Recently approved for HHA MWF for 4 hours. Denies ETOH, tobacco, or illicit drug use.

## 2025-03-02 NOTE — H&P ADULT - HISTORY OF PRESENT ILLNESS
84F, from home, ambulates minimally with walker, PMH multiple myeloma, HTN, hypothyroidism. Presenting after unwitnessed mechanical fall earlier today. Patient a poor historian, collateral obtained from daughter. Per daughter, patient was sitting on bed and attempted to reach for adjacent chair when she fell. Patient was able to call daughter who came with granddaughter to help patient. EMS called for assistance and patient brought to ED. Of note, patient recently recently discharged on Eliquis for Afib on 2/12/25 s/p syncope with hx of frequent falls. On exam, patient more lethargic, however AAOx2 and denied any LOC, dizziness, tongue biting. Patient complaining of pain in b/l flank, denies any dysuria, numbness or tingling.     84F, from home, ambulates minimally with walker, PMH Afib on eliquis, multiple myeloma, HTN, hypothyroidism. Presenting after unwitnessed mechanical fall earlier today. Patient a poor historian, collateral obtained from daughter. Per daughter, patient was sitting on bed and attempted to reach for adjacent chair when she fell. Patient was able to call daughter who came with granddaughter to help patient. EMS called for assistance and patient brought to ED. Of note, patient recently recently discharged on Eliquis for Afib on 2/12/25 s/p syncope with hx of frequent falls. On exam, patient more lethargic, however AAOx2 and denied any LOC, dizziness, tongue biting. Patient complaining of pain in b/l flank, denies any dysuria, numbness or tingling.    In ED  VS: T 97.3 / HR 90 / /58 / 95% O2 on RA / RR 18  K 3.3  Corrected Ca 13.9  Cr 1.21 < 0.73 on recent admission  UA mild positive  CTH neg  s/p 1L IVF bolus, 1g CTX, 4mg morphine IBP, 40meq KCL 84F, from home, ambulates minimally with walker, PMH Afib on eliquis, multiple myeloma, HTN, hypothyroidism. Presenting after unwitnessed mechanical fall earlier today. Patient a poor historian, collateral obtained from daughter. Per daughter, patient was sitting on bed and attempted to reach for adjacent chair when she fell. Patient was able to call daughter who came with granddaughter to help patient. EMS called for assistance and patient brought to ED. Of note, patient recently recently discharged on Eliquis for Afib on 2/12/25 s/p syncope with hx of frequent falls. On exam, patient more lethargic, however AAOx2 and denied any LOC, dizziness, tongue biting. Patient complaining of pain in b/l flank, denies hitting of head, LOC, seizure, dyspnea chest pain, palpitations, visual changes, prior or after the fall. As well as denied abdominal pain, N/V, fever, chills, cough or other upper respiratory Sx, or urinary Sx.      In ED  VS: T 97.3 / HR 90 / /58 / 95% O2 on RA / RR 18  K 3.3  Corrected Ca 13.9  Cr 1.21 < 0.73 on recent admission  UA mild positive  CTH neg  s/p 1L IVF bolus, 1g CTX, 4mg morphine IBP, 40meq KCL

## 2025-03-02 NOTE — H&P ADULT - PROBLEM SELECTOR PLAN 4
hx of MM  Follows with Heme onc Dr Colon ([facility]) outpatient   Completed course of Ninlaro ~6 wks ago. Currently on Revlimid.   CT C/A/P - Reidentified expansile lytic lesions involving ribs, spine pelvic bones, proximal femurs. Multiple subacute healing R rib fractures. No acute pathologic fracture. Hypodensities in the left hepatic lobe and spleen. 1.2 cm RLL pulmonary nodule.   Completed course of Ninlaro ~8 wks ago  Currently on Revlimid and prednisone 20mg daily    - c/w prednisone 20mg daily.  - concern for progression of multiple myeloma.   - pain control.  - consult Heme/Onc in AM     -  DNR DNI  - Social work consulted.

## 2025-03-02 NOTE — H&P ADULT - NSHPREVIEWOFSYSTEMS_GEN_ALL_CORE
CONSTITUTIONAL: Denies fever, chills, fatigue  HEENT: Denies acute changes in vision and hearing  CARDIO: Denies CP, SOB, palpitations  PULM: Denies cough, wheezing, SOB  ABD: Denies abd pain, n/v/d/c  MUSC: Endorses b/l flank & lower back pain  : Denies dysuria, urinary frequency  NEURO: Denies HA, numbness/tingling  EXTREMITIES: Denies LE swelling, calf pain

## 2025-03-02 NOTE — H&P ADULT - PROBLEM SELECTOR PLAN 3
baseline Cr. 0.7-.08  p/w Cr 1.21  CTAP showing bladder wnl, patient making urine  likely pre-renal iso dehydration  s/p 1L bolus in ED  c/w maintenance IVF 100cc/hr  monitor BMP

## 2025-03-02 NOTE — H&P ADULT - ASSESSMENT
84F, from home, ambulates minimally with walker, PMH Afib on eliquis, multiple myeloma, HTN, hypothyroidism presented to ED for mechanical fall. CTH negative, found to have corrected Ca 13.9 iso MM and Cr increase 0.3 with positive UA. Admitted to medicine for MEHRAN, UTI, and management of acute fall.

## 2025-03-02 NOTE — GOALS OF CARE CONVERSATION - ADVANCED CARE PLANNING - CONVERSATION DETAILS
Patient noted to have a prior MOLST stating that she is DNR and DNI. Patient is now confused and unable to make decisions for herself. I spoke with the patient's daughter, Capri Brumfield, regarding the patient and overall GOC. She reports she would like further work-up for the decline in the patient's mental status, but patient is DNR/DNI. I explained that we would correct the calcium and see if her mental status improves any further and if not pursue other avenues. Daughter is in agreement.

## 2025-03-03 LAB
ALBUMIN SERPL ELPH-MCNC: 2.3 G/DL — LOW (ref 3.5–5)
ALP SERPL-CCNC: 88 U/L — SIGNIFICANT CHANGE UP (ref 40–120)
ALT FLD-CCNC: 11 U/L DA — SIGNIFICANT CHANGE UP (ref 10–60)
ANION GAP SERPL CALC-SCNC: 5 MMOL/L — SIGNIFICANT CHANGE UP (ref 5–17)
ANISOCYTOSIS BLD QL: SLIGHT — SIGNIFICANT CHANGE UP
AST SERPL-CCNC: 11 U/L — SIGNIFICANT CHANGE UP (ref 10–40)
BASOPHILS # BLD AUTO: 0 K/UL — SIGNIFICANT CHANGE UP (ref 0–0.2)
BASOPHILS NFR BLD AUTO: 0 % — SIGNIFICANT CHANGE UP (ref 0–2)
BILIRUB SERPL-MCNC: 0.2 MG/DL — SIGNIFICANT CHANGE UP (ref 0.2–1.2)
BUN SERPL-MCNC: 19 MG/DL — HIGH (ref 7–18)
CALCIUM SERPL-MCNC: 11.2 MG/DL — HIGH (ref 8.4–10.5)
CHLORIDE SERPL-SCNC: 113 MMOL/L — HIGH (ref 96–108)
CO2 SERPL-SCNC: 23 MMOL/L — SIGNIFICANT CHANGE UP (ref 22–31)
CREAT SERPL-MCNC: 1.23 MG/DL — SIGNIFICANT CHANGE UP (ref 0.5–1.3)
EGFR: 44 ML/MIN/1.73M2 — LOW
EGFR: 44 ML/MIN/1.73M2 — LOW
EOSINOPHIL # BLD AUTO: 0.06 K/UL — SIGNIFICANT CHANGE UP (ref 0–0.5)
EOSINOPHIL NFR BLD AUTO: 2 % — SIGNIFICANT CHANGE UP (ref 0–6)
GLUCOSE SERPL-MCNC: 133 MG/DL — HIGH (ref 70–99)
HCT VFR BLD CALC: 27.3 % — LOW (ref 34.5–45)
HGB BLD-MCNC: 7.9 G/DL — LOW (ref 11.5–15.5)
LYMPHOCYTES # BLD AUTO: 0.54 K/UL — LOW (ref 1–3.3)
LYMPHOCYTES # BLD AUTO: 17 % — SIGNIFICANT CHANGE UP (ref 13–44)
MAGNESIUM SERPL-MCNC: 1.8 MG/DL — SIGNIFICANT CHANGE UP (ref 1.6–2.6)
MANUAL SMEAR VERIFICATION: SIGNIFICANT CHANGE UP
MCHC RBC-ENTMCNC: 26.2 PG — LOW (ref 27–34)
MCHC RBC-ENTMCNC: 28.9 G/DL — LOW (ref 32–36)
MCV RBC AUTO: 90.4 FL — SIGNIFICANT CHANGE UP (ref 80–100)
MONOCYTES # BLD AUTO: 0.13 K/UL — SIGNIFICANT CHANGE UP (ref 0–0.9)
MONOCYTES NFR BLD AUTO: 4 % — SIGNIFICANT CHANGE UP (ref 2–14)
NEUTROPHILS # BLD AUTO: 2.46 K/UL — SIGNIFICANT CHANGE UP (ref 1.8–7.4)
NEUTROPHILS NFR BLD AUTO: 77 % — SIGNIFICANT CHANGE UP (ref 43–77)
NRBC # BLD: 0 /100 WBCS — SIGNIFICANT CHANGE UP (ref 0–0)
NRBC BLD-RTO: 0 /100 WBCS — SIGNIFICANT CHANGE UP (ref 0–0)
PHOSPHATE SERPL-MCNC: 2.3 MG/DL — LOW (ref 2.5–4.5)
PLAT MORPH BLD: NORMAL — SIGNIFICANT CHANGE UP
PLATELET # BLD AUTO: 145 K/UL — LOW (ref 150–400)
POTASSIUM SERPL-MCNC: 4.6 MMOL/L — SIGNIFICANT CHANGE UP (ref 3.5–5.3)
POTASSIUM SERPL-SCNC: 4.6 MMOL/L — SIGNIFICANT CHANGE UP (ref 3.5–5.3)
PROT SERPL-MCNC: 7.7 G/DL — SIGNIFICANT CHANGE UP (ref 6–8.3)
RBC # BLD: 3.02 M/UL — LOW (ref 3.8–5.2)
RBC # FLD: 22.7 % — HIGH (ref 10.3–14.5)
RBC BLD AUTO: ABNORMAL
SODIUM SERPL-SCNC: 141 MMOL/L — SIGNIFICANT CHANGE UP (ref 135–145)
WBC # BLD: 3.2 K/UL — LOW (ref 3.8–10.5)
WBC # FLD AUTO: 3.2 K/UL — LOW (ref 3.8–10.5)

## 2025-03-03 PROCEDURE — 99223 1ST HOSP IP/OBS HIGH 75: CPT

## 2025-03-03 PROCEDURE — 71045 X-RAY EXAM CHEST 1 VIEW: CPT | Mod: 26

## 2025-03-03 PROCEDURE — 99233 SBSQ HOSP IP/OBS HIGH 50: CPT | Mod: GC

## 2025-03-03 RX ORDER — SENNA 187 MG
2 TABLET ORAL AT BEDTIME
Refills: 0 | Status: DISCONTINUED | OUTPATIENT
Start: 2025-03-03 | End: 2025-03-05

## 2025-03-03 RX ORDER — MINERAL OIL
133 OIL (ML) MISCELLANEOUS ONCE
Refills: 0 | Status: COMPLETED | OUTPATIENT
Start: 2025-03-03 | End: 2025-03-03

## 2025-03-03 RX ORDER — OXYCODONE HYDROCHLORIDE 30 MG/1
5 TABLET ORAL EVERY 6 HOURS
Refills: 0 | Status: DISCONTINUED | OUTPATIENT
Start: 2025-03-03 | End: 2025-03-05

## 2025-03-03 RX ORDER — BISACODYL 5 MG
10 TABLET, DELAYED RELEASE (ENTERIC COATED) ORAL AT BEDTIME
Refills: 0 | Status: DISCONTINUED | OUTPATIENT
Start: 2025-03-03 | End: 2025-03-05

## 2025-03-03 RX ORDER — OXYCODONE HYDROCHLORIDE 30 MG/1
5 TABLET ORAL EVERY 4 HOURS
Refills: 0 | Status: DISCONTINUED | OUTPATIENT
Start: 2025-03-03 | End: 2025-03-03

## 2025-03-03 RX ADMIN — LIDOCAINE HYDROCHLORIDE 1 PATCH: 20 JELLY TOPICAL at 07:30

## 2025-03-03 RX ADMIN — OXYCODONE HYDROCHLORIDE 5 MILLIGRAM(S): 30 TABLET ORAL at 12:54

## 2025-03-03 RX ADMIN — APIXABAN 5 MILLIGRAM(S): 2.5 TABLET, FILM COATED ORAL at 05:51

## 2025-03-03 RX ADMIN — OXYCODONE HYDROCHLORIDE 5 MILLIGRAM(S): 30 TABLET ORAL at 04:27

## 2025-03-03 RX ADMIN — LIDOCAINE HYDROCHLORIDE 1 PATCH: 20 JELLY TOPICAL at 12:50

## 2025-03-03 RX ADMIN — CEFTRIAXONE 100 MILLIGRAM(S): 500 INJECTION, POWDER, FOR SOLUTION INTRAMUSCULAR; INTRAVENOUS at 05:50

## 2025-03-03 RX ADMIN — LIDOCAINE HYDROCHLORIDE 1 PATCH: 20 JELLY TOPICAL at 00:08

## 2025-03-03 RX ADMIN — OXYCODONE HYDROCHLORIDE 5 MILLIGRAM(S): 30 TABLET ORAL at 05:27

## 2025-03-03 RX ADMIN — Medication 2 TABLET(S): at 21:59

## 2025-03-03 RX ADMIN — Medication 133 MILLILITER(S): at 12:52

## 2025-03-03 RX ADMIN — POLYETHYLENE GLYCOL 3350 17 GRAM(S): 17 POWDER, FOR SOLUTION ORAL at 12:52

## 2025-03-03 RX ADMIN — APIXABAN 5 MILLIGRAM(S): 2.5 TABLET, FILM COATED ORAL at 18:00

## 2025-03-03 RX ADMIN — Medication 40 MILLIGRAM(S): at 05:50

## 2025-03-03 RX ADMIN — OXYCODONE HYDROCHLORIDE 5 MILLIGRAM(S): 30 TABLET ORAL at 17:59

## 2025-03-03 RX ADMIN — PREDNISONE 20 MILLIGRAM(S): 20 TABLET ORAL at 05:51

## 2025-03-03 RX ADMIN — Medication 88 MICROGRAM(S): at 05:51

## 2025-03-03 RX ADMIN — Medication 40 MILLIEQUIVALENT(S): at 00:24

## 2025-03-03 NOTE — CONSULT NOTE ADULT - CONSULT REASON
Complex medical decision making in the context of advanced multiple myeloma, malignant hypercalcemia, recurrent falls, and progressive weakness/FTT

## 2025-03-03 NOTE — CONSULT NOTE ADULT - PROBLEM SELECTOR RECOMMENDATION 7
As above.  85 yo with stage multiple myeloma, widespread lytic lesions/mets, POD despite Ninlaro, now ECOG 4 with poor performance status, PPS 30%, admitted with hypercalcemia.  She is not a candidate for any further cancer directed treatment.  Patient is hospice appropriate with likely prognosis of 3 months or less.    See [facility] discussion above--daughter aware of poor prognosis, wishes to proceed with RAJINDER at this time (despite acknowledging that patient is poor rehab candidate); further decline expected, with likely transition to hospice (home vs LTC) in the near future, [facility] aware.    Otherwise continue management as per primary medical team  MOLST DNR/DNI orders in place  Discussed with medical team and Dr. Polo in detail  Palliative Care team will continue to follow

## 2025-03-03 NOTE — CONSULT NOTE ADULT - PROBLEM SELECTOR RECOMMENDATION 4
Discussed with daughter  Will continue oxycodone IR at 5 mg Q 6 hrs PRN  Continue prednisone 20 mg daily (synergistic for bone pain)  Continue lidocaine patch  Potentially may be candidate for long acting opioids (? low dose Fentanyl patch), but daughter declining at this time     Bowel regimen in place with Miralax daily and Senna QHS  Will add oral Dulcolax 10 mg PRN

## 2025-03-03 NOTE — CONSULT NOTE ADULT - PROBLEM SELECTOR RECOMMENDATION 6
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  At significantly increased risk of aspiration, recurrent infections, worsening skin failure/breakdown, and repeat hospital admissions.  Patient grossly hospice appropriate.    PT eval appreciated, recommending RAJ[facility]ER, daughter wishes to proceed with same      Recommend:  Bedside PT as tolerated (needs mental status to improve first)  Offloading of heels  Frequent turning and repositioning in bed Q 2 hrs as tolerated

## 2025-03-03 NOTE — PHYSICAL THERAPY INITIAL EVALUATION ADULT - ADDITIONAL COMMENTS
Per chart, pt was independent with ADLs and ambulates with a rollator prior to fall. has a [employer] ( MW for 4hours) for pt to have assistance at home.

## 2025-03-03 NOTE — PROGRESS NOTE ADULT - ATTENDING COMMENTS
Patient seen and examined. Case discussed with housestaff. Communicated with patient via [place]  #[id number], [place]. Patient remains confused at this time. She reports SOB at time of my evaluation but later denied it to Dr. Gomes. Calcium is improving today but remains elevated at 11.2 (corrected 12.6 for hypoalbunemia). Patient seen and examined. Case discussed with housestaff. Communicated with patient via [place]  #[id number], [place]. Patient remains confused at this time. She reports SOB at time of my evaluation but later denied it to Dr. Gomes. Calcium is improving today but remains elevated at 11.2 (corrected 12.6 for hypoalbunemia). Concern for possible volume overload on CXR so holding IVF for now. Anticipate that calcium will continue to improve as patient is s/p zolendronic acid. Appreciate palliative and hematology follow-up as well as PT. Patient is recommended for [facility]ER placement at this time. Family is interested in RAJINDER and would like to see if the patient makes improvement there before deciding on the hospice/palliative route of care. Will follow-up further recommendations. Remaining care as noted above.

## 2025-03-03 NOTE — CONSULT NOTE ADULT - TIME BILLING
Chart review (including review of imaging and test results), examination of patient, discussion with family via telephone, collaboration with Heme/Onc and primary medical team, and documentation in the medical record.

## 2025-03-03 NOTE — CONSULT NOTE ADULT - PROBLEM SELECTOR RECOMMENDATION 9
Followed by Dr. Colon at [facility], consult note from Dr. Silver appreciated   Previously on treatment with Ninlaro and Revlimid, unable to tolerate due to pancytopenia  Imaging now shows extensive bony metastatic disease, patient also with accelerated functional decline over last 3 weeks, now admitted with worsening mentation/lethargy and malignant hypercalcemia.    Patient is now ECOG 4, essentially bedbound, with PPS score of 30%.  Spoke with Dr. Colon earlier today--we agreed that patient is a very poor candidate for any  future cancer directed treatment.  Patient is hospice appropriate with an estimated prognosis of weeks to < 3 months    See [facility] discussion above--daughter expressed understanding of poor prognosis, open to hospice but not ready at this time, wishes to proceed with plans for RAJINDER with transition to hospice in the near future    MOLST DNR/DNI in place  Continue supportive care

## 2025-03-03 NOTE — CONSULT NOTE ADULT - SUBJECTIVE AND OBJECTIVE BOX
Consult to: Discuss complex medical decision making related to goals of care    [facility] Geriatric and Palliative Consult Service:  Carmen Osuna DO: cell ([phone])  Reggie Sharma MD: cell ([phone])  Adrián Jones NP: cell ([phone])   Jessica Fleischer-Black MD:  cell ([phone])  Kevin Kelley SW: cell ([phone])     Can contact any Palliative Team member via Microsoft Teams for consults and questions      HPI:  84F, from home, ambulates minimally with walker, PMH Afib on eliquis, multiple myeloma, HTN, hypothyroidism. Presenting after unwitnessed mechanical fall earlier today. Patient a poor historian, collateral obtained from daughter. Per daughter, patient was sitting on bed and attempted to reach for adjacent chair when she fell. Patient was able to call daughter who came with granddaughter to help patient. EMS called for assistance and patient brought to ED. Of note, patient recently recently discharged on Eliquis for Afib on 2/12/25 s/p syncope with hx of frequent falls. On exam, patient more lethargic, however AAOx2 and denied any LOC, dizziness, tongue biting. Patient complaining of pain in b/l flank, denies hitting of head, LOC, seizure, dyspnea chest pain, palpitations, visual changes, prior or after the fall. As well as denied abdominal pain, N/V, fever, chills, cough or other upper respiratory Sx, or urinary Sx.      In ED  VS: T 97.3 / HR 90 / /58 / 95% O2 on RA / RR 18  K 3.3  Corrected Ca 13.9  Cr 1.21 < 0.73 on recent admission  UA mild positive  CTH neg  s/p 1L IVF bolus, 1g CTX, 4mg morphine IBP, 40meq KCL (02 Mar 2025 03:23)      PAST MEDICAL & SURGICAL HISTORY:  Depression, reactive      Hypothyroid      Multiple myeloma      HTN (hypertension)      H/O ovarian cystectomy      S/P breast lumpectomy          SOCIAL HISTORY:    Admitted from:  home  (with HHA)           assisted living          [facility]       LTC   [ none ] Substance abuse, [ none ] Tobacco hx, [ none ] Alcohol hx, [ none ] Home Opioid Hx    FAMILY HISTORY:   unable to obtain from patient due to poor mentation, family unable to give information, see H&P for history  Baseline ADLs (prior to admission):    Jain:    Allergies    No Known Allergies    Intolerances      Present Symptoms: Mild, Moderate, Severe  Pain:             Location -                               Aggravating factors -             Quality -             Radiation -             Timing-             Severity (0-10 scale):             Minimal acceptable level (0-10 scale):  Fatigue:  denies  Nausea:  denies  Lack of Appetite:  denies  SOB: denies  Depression:  denies  Anxiety:  denies  Constipation:  denies     Review of Systems:  All other systems reviewed and are negative OR Unable to obtain due to poor mentation      CPOT:    https://ccs-st.org/resources/Documents/Sedation%20Analgesia%20Delirium%20in%20CC/CCS%20STH%20Guideline%20template%20CPOT.pdf  PAIN AD Score:   [email or web address] (press ctrl +  left click to view)      MEDICATIONS  (STANDING):  apixaban 5 milliGRAM(s) Oral two times a day  cefTRIAXone   IVPB 1000 milliGRAM(s) IV Intermittent every 24 hours  levothyroxine 88 MICROGram(s) Oral daily  lidocaine   4% Patch 1 Patch Transdermal daily  pantoprazole    Tablet 40 milliGRAM(s) Oral before breakfast  polyethylene glycol 3350 17 Gram(s) Oral daily  predniSONE   Tablet 20 milliGRAM(s) Oral daily  senna 2 Tablet(s) Oral at bedtime    MEDICATIONS  (PRN):  acetaminophen     Tablet .. 650 milliGRAM(s) Oral every 6 hours PRN Temp greater or equal to 38C (100.4F), Mild Pain (1 - 3)  bisacodyl 10 milliGRAM(s) Oral at bedtime PRN Constipation  melatonin 3 milliGRAM(s) Oral at bedtime PRN Insomnia  ondansetron Injectable 4 milliGRAM(s) IV Push every 8 hours PRN Nausea and/or Vomiting  oxyCODONE    IR 5 milliGRAM(s) Oral every 4 hours PRN Moderate to Severe Pain (4 - 10)      PHYSICAL EXAM:  Vital Signs Last 24 Hrs  T(C): 36.5 (03 Mar 2025 13:39), Max: 36.7 (03 Mar 2025 05:49)  T(F): 97.7 (03 Mar 2025 13:39), Max: 98.1 (03 Mar 2025 05:49)  HR: 91 (03 Mar 2025 13:39) (66 - 91)  BP: 116/68 (03 Mar 2025 13:39) (108/58 - 118/81)  BP(mean): --  RR: 16 (03 Mar 2025 13:39) (16 - 18)  SpO2: 97% (03 Mar 2025 13:39) (95% - 97%)    Parameters below as of 03 Mar 2025 13:39  Patient On (Oxygen Delivery Method): room air        General: alert  oriented x ____    lethargic distressed cachexia  nonverbal  unarousable verbal    Palliative Performance Scale/Karnofsky Score:  http://npcrc.org/files/news/palliative_performance_scale_ppsv2.pdf    HEENT:  EOMI anicteric, pharynx clear, MM moist  Lungs: tachypnea/labored breathing, clear to auscultation, no congestion noted  CV: RRR, tachycardic, normal S1 and S2, no murmur  GI: soft non distended non tender   + normal bowel sounds  : incontinent  oliguria/anuria  price  Musculoskeletal: weakness x4  in all extremities, ambulatory with assistance   mostly/fully bedbound/wheelchair bound, no edema noted  Skin: no abnormal skin lesions or DU noted, poor skin turgor  Neuro: no deficits, mild cognitive impairment dsyphagia/dysarthria paresis  Oral intake ability: unable/only mouth care, minimal moderate full capability    LABS:                        7.9    3.20  )-----------( 145      ( 03 Mar 2025 07:45 )             27.3     03-03    141  |  113[H]  |  19[H]  ----------------------------<  133[H]  4.6   |  23  |  1.23    Ca    11.2[H]      03 Mar 2025 07:45  Phos  2.3     03-03  Mg     1.8     03-03    TPro  7.7  /  Alb  2.3[L]  /  TBili  0.2  /  DBili  x   /  AST  11  /  ALT  11  /  AlkPhos  88  03-03    Urinalysis Basic - ( 03 Mar 2025 07:45 )    Color: x / Appearance: x / SG: x / pH: x  Gluc: 133 mg/dL / Ketone: x  / Bili: x / Urobili: x   Blood: x / Protein: x / Nitrite: x   Leuk Esterase: x / RBC: x / WBC x   Sq Epi: x / Non Sq Epi: x / Bacteria: x        RADIOLOGY & ADDITIONAL STUDIES:     Consult to: Discuss complex medical decision making related to goals of care    [facility] Geriatric and Palliative Consult Service:  Carmen Osuna DO: cell ([phone])  Reggie Sharma MD: cell ([phone])  Adrián Jones NP: cell ([phone])   Jessica Fleischer-Black MD:  cell ([phone])  Kevin Kelley SW: cell ([phone])     Can contact any Palliative Team member via Microsoft Teams for consults and questions      HPI:  84F, from home, ambulates minimally with walker, PMH Afib on Eliquis, multiple myeloma (followed by Dr. Colon at [facility]), HTN, hypothyroidism. Presenting after unwitnessed mechanical fall earlier today. Patient a poor historian, collateral obtained from daughter. Per daughter, patient was sitting on bed and attempted to reach for adjacent chair when she fell. Patient was able to call daughter who came with granddaughter to help patient. EMS called for assistance and patient brought to ED. Of note, patient recently recently discharged on Eliquis for Afib on 2/12/25 s/p syncope with hx of frequent falls. On exam, patient more lethargic, however AAOx2 and denied any LOC, dizziness, tongue biting. Patient complaining of pain in b/l flank, denies hitting of head, LOC, seizure, dyspnea chest pain, palpitations, visual changes, prior or after the fall. As well as denied abdominal pain, N/V, fever, chills, cough or other upper respiratory Sx, or urinary Sx.      In ED  VS: T 97.3 / HR 90 / /58 / 95% O2 on RA / RR 18  K 3.3  Corrected Ca 13.9  Cr 1.21 < 0.73 on recent admission  UA mild positive  CTH neg  s/p 1L IVF bolus, 1g CTX, 4mg morphine IBP, 40meq KCL (02 Mar 2025 03:23)    Patient was admitted for MEHRAN, UTI, management of acute fall, and hypercalcemia in the setting of multiple myeloma with widespread lytic lesions and metastatic disease.  Started on IV ceftriaxone and aggressive IV hydration, also received zoledronic acid x 1 dose.  Palliative Care consultation requested for complex medical decision making and additional GOC discussion.    Old chart reviewed--patient previously hospitalized at [facility] 2/7 to 2/12 for syncope work-up with recurrent falls, new onset atrial fibrillation, had progression of multiple myeloma per CT imaging at that time. Echo revealed severe pulmonary HTN with PASP of 80.      Patient examined earlier today at bedside.  History obtained with use of Rwandan speaking  via video relay service (ID# [id number], Zyulfibladimir).   Patient lethargic but easily responsive.  Alert and oriented x 2, able to answer simple questions.  Will open eyes and speak briefly then go right back to sleep.  Reports 5/10 back pain.  Denies chest pain, SOB, nausea, vomiting, diarrhea, or constipation.  + generalized weakness.  No other acute complaints.        NSYPMP/I-STOP profile reviewed--no Oxaydo/oxycodone ER seen on patient's profile, only two scripts for Tramadol, followed by recent scripts for oxycodone IR 5 mg.  No red flags.      PAST MEDICAL & SURGICAL HISTORY:  Depression, reactive      Hypothyroid      Multiple myeloma      HTN (hypertension)      H/O ovarian cystectomy      S/P breast lumpectomy          SOCIAL HISTORY:    Admitted from:  home  (with HHA)           assisted living          [facility]   [ none ] Substance abuse, [ none ] Tobacco hx, [ none ] Alcohol hx, [ none ] Home Opioid Hx    FAMILY HISTORY:   unable to obtain from patient due to poor mentation, family unable to give information, see H&P for history  Baseline ADLs (prior to admission):    Gnosticism:    Allergies    No Known Allergies    Intolerances      Present Symptoms: Mild, Moderate, Severe  Pain:             Location -                               Aggravating factors -             Quality -             Radiation -             Timing-             Severity (0-10 scale):             Minimal acceptable level (0-10 scale):  Fatigue:  denies  Nausea:  denies  Lack of Appetite:  denies  SOB: denies  Depression:  denies  Anxiety:  denies  Constipation:  denies     Review of Systems:  All other systems reviewed and are negative OR Unable to obtain due to poor mentation      CPOT:    https://ccs-st.org/resources/Documents/Sedation%20Analgesia%20Delirium%20in%20CC/CCS%20STH%20Guideline%20template%20CPOT.pdf  PAIN AD Score:   [email or web address] (press ctrl +  left click to view)      MEDICATIONS  (STANDING):  apixaban 5 milliGRAM(s) Oral two times a day  cefTRIAXone   IVPB 1000 milliGRAM(s) IV Intermittent every 24 hours  levothyroxine 88 MICROGram(s) Oral daily  lidocaine   4% Patch 1 Patch Transdermal daily  pantoprazole    Tablet 40 milliGRAM(s) Oral before breakfast  polyethylene glycol 3350 17 Gram(s) Oral daily  predniSONE   Tablet 20 milliGRAM(s) Oral daily  senna 2 Tablet(s) Oral at bedtime    MEDICATIONS  (PRN):  acetaminophen     Tablet .. 650 milliGRAM(s) Oral every 6 hours PRN Temp greater or equal to 38C (100.4F), Mild Pain (1 - 3)  bisacodyl 10 milliGRAM(s) Oral at bedtime PRN Constipation  melatonin 3 milliGRAM(s) Oral at bedtime PRN Insomnia  ondansetron Injectable 4 milliGRAM(s) IV Push every 8 hours PRN Nausea and/or Vomiting  oxyCODONE    IR 5 milliGRAM(s) Oral every 4 hours PRN Moderate to Severe Pain (4 - 10)      PHYSICAL EXAM:  Vital Signs Last 24 Hrs  T(C): 36.5 (03 Mar 2025 13:39), Max: 36.7 (03 Mar 2025 05:49)  T(F): 97.7 (03 Mar 2025 13:39), Max: 98.1 (03 Mar 2025 05:49)  HR: 91 (03 Mar 2025 13:39) (66 - 91)  BP: 116/68 (03 Mar 2025 13:39) (108/58 - 118/81)  BP(mean): --  RR: 16 (03 Mar 2025 13:39) (16 - 18)  SpO2: 97% (03 Mar 2025 13:39) (95% - 97%)    Parameters below as of 03 Mar 2025 13:39  Patient On (Oxygen Delivery Method): room air        General: alert  oriented x ____    lethargic distressed cachexia  nonverbal  unarousable verbal    Palliative Performance Scale/Karnofsky Score:  http://npcrc.org/files/news/palliative_performance_scale_ppsv2.pdf    HEENT:  EOMI anicteric, pharynx clear, MM moist  Lungs: tachypnea/labored breathing, clear to auscultation, no congestion noted  CV: RRR, tachycardic, normal S1 and S2, no murmur  GI: soft non distended non tender   + normal bowel sounds  : incontinent  oliguria/anuria  price  Musculoskeletal: weakness x4  in all extremities, ambulatory with assistance   mostly/fully bedbound/wheelchair bound, no edema noted  Skin: no abnormal skin lesions or DU noted, poor skin turgor  Neuro: no deficits, mild cognitive impairment dsyphagia/dysarthria paresis  Oral intake ability: unable/only mouth care, minimal moderate full capability    LABS:                        7.9    3.20  )-----------( 145      ( 03 Mar 2025 07:45 )             27.3     03-03    141  |  113[H]  |  19[H]  ----------------------------<  133[H]  4.6   |  23  |  1.23    Ca    11.2[H]      03 Mar 2025 07:45  Phos  2.3     03-03  Mg     1.8     03-03    TPro  7.7  /  Alb  2.3[L]  /  TBili  0.2  /  DBili  x   /  AST  11  /  ALT  11  /  AlkPhos  88  03-03    Urinalysis Basic - ( 03 Mar 2025 07:45 )    Color: x / Appearance: x / SG: x / pH: x  Gluc: 133 mg/dL / Ketone: x  / Bili: x / Urobili: x   Blood: x / Protein: x / Nitrite: x   Leuk Esterase: x / RBC: x / WBC x   Sq Epi: x / Non Sq Epi: x / Bacteria: x        RADIOLOGY & ADDITIONAL STUDIES:     Consult to: Discuss complex medical decision making related to goals of care    [facility] Geriatric and Palliative Consult Service:  Carmen Osuna DO: cell ([phone])  Reggie Sharma MD: cell ([phone])  Adrián Jones NP: cell ([phone])   Jessica Fleischer-Black MD:  cell ([phone])  Kevin Kelley SW: cell ([phone])     Can contact any Palliative Team member via Microsoft Teams for consults and questions      HPI:  84F, from home, ambulates minimally with walker, PMH Afib on Eliquis, multiple myeloma (followed by Dr. Colon at [facility]), HTN, hypothyroidism. Presenting after unwitnessed mechanical fall earlier today. Patient a poor historian, collateral obtained from daughter. Per daughter, patient was sitting on bed and attempted to reach for adjacent chair when she fell. Patient was able to call daughter who came with granddaughter to help patient. EMS called for assistance and patient brought to ED. Of note, patient recently recently discharged on Eliquis for Afib on 2/12/25 s/p syncope with hx of frequent falls. On exam, patient more lethargic, however AAOx2 and denied any LOC, dizziness, tongue biting. Patient complaining of pain in b/l flank, denies hitting of head, LOC, seizure, dyspnea chest pain, palpitations, visual changes, prior or after the fall. As well as denied abdominal pain, N/V, fever, chills, cough or other upper respiratory Sx, or urinary Sx.      In ED  VS: T 97.3 / HR 90 / /58 / 95% O2 on RA / RR 18  K 3.3  Corrected Ca 13.9  Cr 1.21 < 0.73 on recent admission  UA mild positive  CTH neg  s/p 1L IVF bolus, 1g CTX, 4mg morphine IBP, 40meq KCL (02 Mar 2025 03:23)    Patient was admitted for MEHRAN, UTI, management of acute fall, and hypercalcemia in the setting of multiple myeloma with widespread lytic lesions and metastatic disease.  Started on IV ceftriaxone and aggressive IV hydration, also received zoledronic acid x 1 dose.  Palliative Care consultation requested for complex medical decision making and additional GOC discussion.    Old chart reviewed--patient previously hospitalized at [facility] 2/7 to 2/12 for syncope work-up with recurrent falls, new onset atrial fibrillation, had progression of multiple myeloma per CT imaging at that time. Echo revealed severe pulmonary HTN with PASP of 80.      Patient examined earlier today at bedside.  History obtained with use of Montenegrin speaking  via video relay service (ID# [id number], Zyulfiya).   Patient lethargic but easily responsive.  Alert and oriented x 2, able to answer simple questions.  Will open eyes and speak briefly then go right back to sleep.  Reports 5/10 back pain.  Denies chest pain, SOB, nausea, vomiting, diarrhea, or constipation.  + generalized weakness.  No other acute complaints.        NSYPMP/I-STOP profile reviewed--no Oxaydo/oxycodone ER seen on patient's profile, only two scripts for Tramadol, followed by recent scripts for oxycodone IR 5 mg.  No red flags.      PAST MEDICAL & SURGICAL HISTORY:  Depression, reactive      Hypothyroid      Multiple myeloma      HTN (hypertension)      H/O ovarian cystectomy      S/P breast lumpectomy          SOCIAL HISTORY:    Admitted from:  home   lives alone, daughter lives close by, is primary caregiver   Private pay HHA 4 hrs x 3 days  [ none ] Substance abuse, [ none ] Tobacco hx, [ none ] Alcohol hx, [ none ] Home Opioid Hx    FAMILY HISTORY:   unable to obtain from patient due to poor mentation, family unable to give information, see H&P for history  Baseline ADLs (prior to admission):  Prior to last admission was ambulatory and functional in ADLs.   Now increasingly bedbound, requiring extensive assistance    Oriental orthodox:  no Latter-day affiliation (? Buddhist)    Allergies    No Known Allergies    Intolerances      Present Symptoms: Mild, Moderate, Severe  Pain:  moderate             Location -    back/spine                        Aggravating factors -             Quality -  dullness             Radiation -  denies             Timing-  intermittent             Severity (0-10 scale):  5/10             Minimal acceptable level (0-10 scale):  0/10  Fatigue:   moderate (++ generalized weakness)  Nausea:  denies  Lack of Appetite:  denies  SOB: denies  Constipation:  denies     Review of Systems:   Otherwise unable to obtain due to poor mentation/lethargy      MEDICATIONS  (STANDING):  apixaban 5 milliGRAM(s) Oral two times a day  cefTRIAXone   IVPB 1000 milliGRAM(s) IV Intermittent every 24 hours  levothyroxine 88 MICROGram(s) Oral daily  lidocaine   4% Patch 1 Patch Transdermal daily  pantoprazole    Tablet 40 milliGRAM(s) Oral before breakfast  polyethylene glycol 3350 17 Gram(s) Oral daily  predniSONE   Tablet 20 milliGRAM(s) Oral daily  senna 2 Tablet(s) Oral at bedtime    MEDICATIONS  (PRN):  acetaminophen     Tablet .. 650 milliGRAM(s) Oral every 6 hours PRN Temp greater or equal to 38C (100.4F), Mild Pain (1 - 3)  bisacodyl 10 milliGRAM(s) Oral at bedtime PRN Constipation  melatonin 3 milliGRAM(s) Oral at bedtime PRN Insomnia  ondansetron Injectable 4 milliGRAM(s) IV Push every 8 hours PRN Nausea and/or Vomiting  oxyCODONE    IR 5 milliGRAM(s) Oral every 4 hours PRN Moderate to Severe Pain (4 - 10)      PHYSICAL EXAM:  Vital Signs Last 24 Hrs  T(C): 36.5 (03 Mar 2025 13:39), Max: 36.7 (03 Mar 2025 05:49)  T(F): 97.7 (03 Mar 2025 13:39), Max: 98.1 (03 Mar 2025 05:49)  HR: 91 (03 Mar 2025 13:39) (66 - 91)  BP: 116/68 (03 Mar 2025 13:39) (108/58 - 118/81)  BP(mean): --  RR: 16 (03 Mar 2025 13:39) (16 - 18)  SpO2: 97% (03 Mar 2025 13:39) (95% - 97%)    Parameters below as of 03 Mar 2025 13:39  Patient On (Oxygen Delivery Method): room air        General: alert  oriented x __2__    lethargic but easily responsive, NAD    Palliative Performance Scale/Karnofsky Score:  http://npcrc.org/files/news/palliative_performance_scale_ppsv2.pdf    HEENT:  EOMI anicteric, pharynx clear, MM moist  Lungs:  overall clear to auscultation, respirations unlabored, no congestion noted  CV: irreg irreg (a fib), normal S1 and S2, no murmur  GI: soft non distended non tender   + normal bowel sounds  : incontinent    Musculoskeletal: weakness x4  in all extremities, ambulatory with assistance but now mostly bedbound, mild LE edema noted  Skin: no abnormal skin lesions or DU noted  Neuro: no focal deficits  Oral intake ability:  full capability, on regular diet      LABS:                        7.9    3.20  )-----------( 145      ( 03 Mar 2025 07:45 )             27.3     03-03    141  |  113[H]  |  19[H]  ----------------------------<  133[H]  4.6   |  23  |  1.23    Ca    11.2[H]      03 Mar 2025 07:45  Phos  2.3     03-03  Mg     1.8     03-03    TPro  7.7  /  Alb  2.3[L]  /  TBili  0.2  /  DBili  x   /  AST  11  /  ALT  11  /  AlkPhos  88  03-03    Urinalysis Basic - ( 03 Mar 2025 07:45 )    Color: x / Appearance: x / SG: x / pH: x  Gluc: 133 mg/dL / Ketone: x  / Bili: x / Urobili: x   Blood: x / Protein: x / Nitrite: x   Leuk Esterase: x / RBC: x / WBC x   Sq Epi: x / Non Sq Epi: x / Bacteria: x        RADIOLOGY & ADDITIONAL STUDIES:     Consult to: Discuss complex medical decision making related to goals of care    [facility] Geriatric and Palliative Consult Service:  Carmen Osuna DO: cell ([phone])  Reggie Sharma MD: cell ([phone])  Adrián Jones NP: cell ([phone])   Jessica Fleischer-Black MD:  cell ([phone])  Kevin Kelley SW: cell ([phone])     Can contact any Palliative Team member via Microsoft Teams for consults and questions      HPI:  84F, from home, ambulates minimally with walker, PMH Afib on Eliquis, multiple myeloma (followed by Dr. Colon at [facility]), HTN, hypothyroidism. Presenting after unwitnessed mechanical fall earlier today. Patient a poor historian, collateral obtained from daughter. Per daughter, patient was sitting on bed and attempted to reach for adjacent chair when she fell. Patient was able to call daughter who came with granddaughter to help patient. EMS called for assistance and patient brought to ED. Of note, patient recently recently discharged on Eliquis for Afib on 2/12/25 s/p syncope with hx of frequent falls. On exam, patient more lethargic, however AAOx2 and denied any LOC, dizziness, tongue biting. Patient complaining of pain in b/l flank, denies hitting of head, LOC, seizure, dyspnea chest pain, palpitations, visual changes, prior or after the fall. As well as denied abdominal pain, N/V, fever, chills, cough or other upper respiratory Sx, or urinary Sx.      In ED  VS: T 97.3 / HR 90 / /58 / 95% O2 on RA / RR 18  K 3.3  Corrected Ca 13.9  Cr 1.21 < 0.73 on recent admission  UA mild positive  CTH neg  s/p 1L IVF bolus, 1g CTX, 4mg morphine IBP, 40meq KCL (02 Mar 2025 03:23)    Patient was admitted for MEHRAN, UTI, management of acute fall, and hypercalcemia in the setting of multiple myeloma with widespread lytic lesions and metastatic disease.  Started on IV ceftriaxone and aggressive IV hydration, also received zoledronic acid x 1 dose.  Palliative Care consultation requested for complex medical decision making and additional GOC discussion.    Old chart reviewed--patient previously hospitalized at [facility] 2/7 to 2/12 for syncope work-up with recurrent falls, new onset atrial fibrillation, had progression of multiple myeloma per CT imaging at that time. Echo revealed severe pulmonary HTN with PASP of 80.      Patient examined earlier today at bedside.  History obtained with use of Moldovan speaking  via video relay service (ID# [id number], Zyulfiya).   Patient lethargic but easily responsive.  Alert and oriented x 2, able to answer simple questions.  Will open eyes and speak briefly then go right back to sleep.  Reports 5/10 back pain.  Denies chest pain, SOB, nausea, vomiting, diarrhea, or constipation.  + generalized weakness.  No other acute complaints.        NSYPMP/I-STOP profile reviewed--no Oxaydo/oxycodone ER seen on patient's profile, only two scripts for Tramadol, followed by recent scripts for oxycodone IR 5 mg.  No red flags.      PAST MEDICAL & SURGICAL HISTORY:  Depression, reactive      Hypothyroid      Multiple myeloma      HTN (hypertension)      H/O ovarian cystectomy      S/P breast lumpectomy          SOCIAL HISTORY:    Admitted from:  home   lives alone, daughter lives close by, is primary caregiver   Private pay HHA 4 hrs x 3 days  [ none ] Substance abuse, [ none ] Tobacco hx, [ none ] Alcohol hx, [ none ] Home Opioid Hx    FAMILY HISTORY:   unable to obtain from patient due to poor mentation, family unable to give information, see H&P for history  Baseline ADLs (prior to admission):  Prior to last admission was ambulatory and functional in ADLs.   Now increasingly bedbound, requiring extensive assistance    Moravian:  no Jainism affiliation (? [facility])    Allergies    No Known Allergies    Intolerances      Present Symptoms: Mild, Moderate, Severe  Pain:  moderate             Location -    back/spine                        Aggravating factors -             Quality -  dullness             Radiation -  denies             Timing-  intermittent             Severity (0-10 scale):  5/10             Minimal acceptable level (0-10 scale):  0/10  Fatigue:   moderate (++ generalized weakness)  Nausea:  denies  Lack of Appetite:  denies  SOB: denies  Constipation:  denies     Review of Systems:   Otherwise unable to obtain due to poor mentation/lethargy      MEDICATIONS  (STANDING):  apixaban 5 milliGRAM(s) Oral two times a day  cefTRIAXone   IVPB 1000 milliGRAM(s) IV Intermittent every 24 hours  levothyroxine 88 MICROGram(s) Oral daily  lidocaine   4% Patch 1 Patch Transdermal daily  pantoprazole    Tablet 40 milliGRAM(s) Oral before breakfast  polyethylene glycol 3350 17 Gram(s) Oral daily  predniSONE   Tablet 20 milliGRAM(s) Oral daily  senna 2 Tablet(s) Oral at bedtime    MEDICATIONS  (PRN):  acetaminophen     Tablet .. 650 milliGRAM(s) Oral every 6 hours PRN Temp greater or equal to 38C (100.4F), Mild Pain (1 - 3)  bisacodyl 10 milliGRAM(s) Oral at bedtime PRN Constipation  melatonin 3 milliGRAM(s) Oral at bedtime PRN Insomnia  ondansetron Injectable 4 milliGRAM(s) IV Push every 8 hours PRN Nausea and/or Vomiting  oxyCODONE    IR 5 milliGRAM(s) Oral every 4 hours PRN Moderate to Severe Pain (4 - 10)      PHYSICAL EXAM:  Vital Signs Last 24 Hrs  T(C): 36.5 (03 Mar 2025 13:39), Max: 36.7 (03 Mar 2025 05:49)  T(F): 97.7 (03 Mar 2025 13:39), Max: 98.1 (03 Mar 2025 05:49)  HR: 91 (03 Mar 2025 13:39) (66 - 91)  BP: 116/68 (03 Mar 2025 13:39) (108/58 - 118/81)  BP(mean): --  RR: 16 (03 Mar 2025 13:39) (16 - 18)  SpO2: 97% (03 Mar 2025 13:39) (95% - 97%)    Parameters below as of 03 Mar 2025 13:39  Patient On (Oxygen Delivery Method): room air        General: alert  oriented x __2__    lethargic but easily responsive, NAD    Palliative Performance Scale/Karnofsky Score:  http://npcrc.org/files/news/palliative_performance_scale_ppsv2.pdf    HEENT:  EOMI anicteric, pharynx clear, MM moist  Lungs:  overall clear to auscultation, respirations unlabored, no congestion noted  CV: irreg irreg (a fib), normal S1 and S2, no murmur  GI: soft non distended non tender   + normal bowel sounds  : incontinent    Musculoskeletal: weakness x4  in all extremities, ambulatory with assistance but now mostly bedbound, mild LE edema noted  Skin: no abnormal skin lesions or DU noted  Neuro: no focal deficits  Oral intake ability:  full capability, on regular diet      LABS:                        7.9    3.20  )-----------( 145      ( 03 Mar 2025 07:45 )             27.3     03-03    141  |  113[H]  |  19[H]  ----------------------------<  133[H]  4.6   |  23  |  1.23    Ca    11.2[H]      03 Mar 2025 07:45  Phos  2.3     03-03  Mg     1.8     03-03    TPro  7.7  /  Alb  2.3[L]  /  TBili  0.2  /  DBili  x   /  AST  11  /  ALT  11  /  AlkPhos  88  03-03    Urinalysis Basic - ( 03 Mar 2025 07:45 )    Color: x / Appearance: x / SG: x / pH: x  Gluc: 133 mg/dL / Ketone: x  / Bili: x / Urobili: x   Blood: x / Protein: x / Nitrite: x   Leuk Esterase: x / RBC: x / WBC x   Sq Epi: x / Non Sq Epi: x / Bacteria: x        RADIOLOGY & ADDITIONAL STUDIES:    ACC: [id number] EXAM:  CT ABDOMEN AND PELVIS IC   ORDERED BY: ASPEN HOWELL     ACC: [id number] EXAM:  CT CHEST IC   ORDERED BY: ASPEN HOWELL     PROCEDURE DATE:  03/01/2025          INTERPRETATION:  CLINICAL INFORMATION: Injury.    COMPARISON: CT torso 2/8/2025. CT chest 2/10/2025.    CONTRAST/COMPLICATIONS:  IV Contrast: Omnipaque 350 (accession [id number]), IV contrast documented   in unlinked concurrent exam (accession [id number])  90 cc administered   10   cc discarded  Oral Contrast: NONE    PROCEDURE:  CT of the Chest, Abdomen and Pelvis was performed.  Sagittal and coronal reformats were performed.    FINDINGS:Absence of intravenous contrast limits evaluation of vasculature   and visceral organs.    CHEST:  LUNGS AND LARGE AIRWAYS: Patent central airways. Respiratory motion   artifact limits detailed evaluation of the right lower lobe 1.2 cm left   upper lobe and sub-5 mm lung nodule. Mild bilateral dependent   atelectasis. Mild mosaic attenuation with suggestion of interlobular   septal thickening.  PLEURA: No pleural effusion.  VESSELS: Aorta and coronary artery calcifications.  HEART: Heart size is normal. No pericardial effusion.  MEDIASTINUM AND JOEL: No lymphadenopathy.  CHEST WALL AND LOWER NECK: Unchanged lesion containing fat, soft tissue   density and calcifications in the posterior left shoulder, deep to the   deltoid. Extravasation of contrast identified within the left arm.    ABDOMEN AND PELVIS:  LIVER: Indeterminate 1.3 cm hypodensity in the left hepatic lobe.   Additional subcentimeter hypodensities too small to characterize.  BILE DUCTS: Normal caliber.  GALLBLADDER: Cholelithiasis.  SPLEEN: 1.9 cm indeterminate hypodensity in the spleen.  PANCREAS: Within normal limits.  ADRENALS: Within normal limits.  KIDNEYS/URETERS: No hydronephrosis.Bilateral renal cortical and renal   sinus cysts.    BLADDER: Within normal limits.  REPRODUCTIVE ORGANS: Calcified fibroid uterus.    BOWEL: No bowel obstruction. Nonvisualized appendix.  PERITONEUM/RETROPERITONEUM: Within normal limits.  VESSELS: Atherosclerotic changes.  LYMPH NODES: No lymphadenopathy.  ABDOMINAL WALL: Within normal limits.  BONES: Degenerative changes. Reidentified expansile lytic lesions   involving the ribs, visualized spine, pelvic bones, and proximal femurs,   consistent with history of multiple myeloma. Remote appearing deformities   of bilateral ribs. Evaluation of ribs is limited secondary to respiratory   motion artifact. Consider nonemergent dedicated rib series if indicated.    IMPRESSION:    No sequela of acute traumatic injury within the chest, abdomen and pelvis   allowing for the limitation of absence of contrast.    Remote appearing deformities of bilateral ribs. Evaluation of ribs is   limited secondary to respiratory motion artifact. Consider nonemergent   dedicated rib series if indicated.    Mild mosaic attenuation with suggestion of interlobular septal   thickening. Differential includes atypical infection or mild pulmonary   vascular congestion. Recommend clinical correlation.    Extravasation of contrast identified within the left arm. Elevation of   the extremity, cold compresses, evaluation of peripheral pulses and   follow-up with plastic surgery is suggested.    Reidentified expansile lytic lesions involvingthe ribs, visualized   spine, pelvic bones, and proximal femurs, consistent with history of   multiple myeloma.    Unchanged lesion containing fat, soft tissue density and calcifications   in the posterior left shoulder, deep to the deltoid. Considernonemergent   MR.    These critical results were discussed via telephone at 3/2/2025 12:56 AM   by Dr. Gil of radiology with Dr. Dumas.    --- End of Report ---      ACC: [id number] EXAM:  CT BRAIN   ORDERED BY: ASPEN HOWELL     ACC: [id number] EXAM:  CT CERVICAL SPINE   ORDERED BY: ASPEN HOWELL     PROCEDURE DATE:  03/01/2025          INTERPRETATION:  CLINICAL INFORMATION: Trauma. Fall.    TECHNIQUE:  Axial CT images were acquired through the head and cervical spine.  Intravenous contrast: None  Two-dimensional reformats were generated.    COMPARISON STUDY: 02/09/2025    FINDINGS:  CT head:    There is no CT evidence of acute intracranial hemorrhage, mass effect,   midline shift, or acute territorial infarct.    The ventricles and sulci   are normal in size and configuration. The basal cisterns are patent.    The visualized paranasal sinuses are clear.    The mastoid air cells and middle ear cavities are grossly clear.    Numerous calvarial lucencies again noted, consistent with history of   multiple myeloma.      CT cervical spine:  Diagnostic accuracy is limited secondary to patient motion.    There is straightening of the cervical lordosis.  There is noCT evidence of an acute cervical spine fracture or traumatic   malalignment.  Scattered lytic lesions consistent with history of multiple myeloma.   Lytic expansion of the left first rib. Chronic right rib fracture.  The paraspinous soft tissues are unremarkable within limits of CT scan.    Degenerative changes:  There are multilevel degenerative changes characterized by disc   osteophyte complexes and facet and uncinate hypertrophy with resultant   mild to moderate multilevel central canal and neural foraminal stenosis.    Incidental findings:  Visualized soft tissues of the neck are unremarkable.  Visualized lung apices are unremarkable.      IMPRESSION:    CT BRAIN:    No evidence of acute intracranial hemorrhage, midline shift or CT   evidence of acute territorial infarct.    If the patient's symptoms persist, consider short interval follow-up head   CT or brain MRI if there are no MRI contraindications.      CT CERVICAL SPINE:    No acute cervical fracture or traumatic malalignment.    MRI would be required to evaluate the ligamentous structures at higher   sensitivity as well as for better evaluation of the cervical canal and   its contents.    --- End of Report ---    TRANSTHORACIC ECHOCARDIOGRAM REPORT  ________________________________________________________________________________                                      _______    Pt. Name:       ELLIOTT JONES Study Date:    2/9/2025  (from previous [facility] admission)  MRN:            [id number]   YOB: 1940  Accession #:    [id number]    Age:           84 years  _______________________________________________________________________________________     CONCLUSIONS:      1. Left ventricular cavity is normal in size. Left ventricular systolic function is normal. There are no regional wall motion abnormalities seen.   2. There is increased LV mass and concentric hypertrophy.   3. Mild left ventricular hypertrophy.   4. There is mild (grade 1) left ventricular diastolic dysfunction.   5. Normal right ventricular cavity size, with normal wall thickness, and normal right ventricular systolic function. Tricuspid annular plane systolic excursion (TAPSE) is 3.0 cm (normal >=1.7 cm).   6. Mild mitral regurgitation.   7. Normal left and right atrial size.   8. Moderate tricuspid regurgitation.   9. Mild pulmonic regurgitation.  10. Estimated pulmonary artery systolic pressure is 80 mmHg, consistent with severe pulmonary hypertension.  11. The inferior vena cava is dilated measuring 2.40 cm in diameter, (dilated >2.1cm) with abnormal inspiratory collapse (abnormal <50%) consistent with elevated right atrial pressure (~15, range 10-20mmHg).  12. No pericardial effusion seen.  13. No prior echocardiogram is available for comparison.           Consult to: Discuss complex medical decision making related to goals of care    [facility] Geriatric and Palliative Consult Service:  Carmen Osuna DO: cell ([phone])  Reggie Sharma MD: cell ([phone])  Adrián Jones NP: cell ([phone])   Jessica Fleischer-Black MD:  cell ([phone])  Kevin Kelley SW: cell ([phone])     Can contact any Palliative Team member via [employer] Teams for consults and questions      HPI:  84F, from home, ambulates minimally with walker, PMH Afib on Eliquis, multiple myeloma (followed by Dr. Colon at [facility]), HTN, hypothyroidism. Presenting after unwitnessed mechanical fall earlier today. Patient a poor historian, collateral obtained from daughter. Per daughter, patient was sitting on bed and attempted to reach for adjacent chair when she fell. Patient was able to call daughter who came with granddaughter to help patient. EMS called for assistance and patient brought to ED. Of note, patient recently recently discharged on Eliquis for Afib on 2/12/25 s/p syncope with hx of frequent falls. On exam, patient more lethargic, however AAOx2 and denied any LOC, dizziness, tongue biting. Patient complaining of pain in b/l flank, denies hitting of head, LOC, seizure, dyspnea chest pain, palpitations, visual changes, prior or after the fall. As well as denied abdominal pain, N/V, fever, chills, cough or other upper respiratory Sx, or urinary Sx.      In ED  VS: T 97.3 / HR 90 / /58 / 95% O2 on RA / RR 18  K 3.3  Corrected Ca 13.9  Cr 1.21 < 0.73 on recent admission  UA mild positive  CTH neg  s/p 1L IVF bolus, 1g CTX, 4mg morphine IBP, 40meq KCL (02 Mar 2025 03:23)    Patient was admitted for MEHRAN, UTI, management of acute fall, and hypercalcemia in the setting of multiple myeloma with widespread lytic lesions and metastatic disease.  Started on IV ceftriaxone and aggressive IV hydration, also received zoledronic acid x 1 dose.  Palliative Care consultation requested for complex medical decision making and additional GOC discussion.    Old chart reviewed--patient previously hospitalized at [facility] 2/7 to 2/12 for syncope work-up with recurrent falls, new onset atrial fibrillation, had progression of multiple myeloma per CT imaging at that time. Echo revealed severe pulmonary HTN with PASP of 80.      Patient examined earlier today at bedside.  History obtained with use of [place] speaking  via video relay service (ID# [id number], [employer]).   Patient lethargic but easily responsive.  Alert and oriented x 2, able to answer simple questions.  Will open eyes and speak briefly then go right back to sleep.  Reports 5/10 back pain.  Denies chest pain, SOB, nausea, vomiting, diarrhea, or constipation.  + generalized weakness.  No other acute complaints.        NSYPMP/I-STOP profile reviewed--no Oxaydo/oxycodone ER seen on patient's profile, only two scripts for Tramadol, followed by recent scripts for oxycodone IR 5 mg.  No red flags.      PAST MEDICAL & SURGICAL HISTORY:  Depression, reactive      Hypothyroid      Multiple myeloma      HTN (hypertension)      H/O ovarian cystectomy      S/P breast lumpectomy          SOCIAL HISTORY:    Admitted from:  home   lives alone, daughter lives close by, is primary caregiver   Private pay HHA 4 hrs x 3 days  [ none ] Substance abuse, [ none ] Tobacco hx, [ none ] Alcohol hx, [ none ] Home Opioid Hx    FAMILY HISTORY:   unable to obtain from patient due to poor mentation, family unable to give information, see H&P for history  Baseline ADLs (prior to admission):  Prior to last admission was ambulatory and functional in ADLs.   Now increasingly bedbound, requiring extensive assistance    M[employer]on:  no Scientology affiliation (? Adventism)    Allergies    No Known Allergies    Intolerances      Present Symptoms: Mild, Moderate, Severe  Pain:  moderate             Location -    back/spine                        Aggravating factors -             Quality -  dullness             Radiation -  denies             Timing-  intermittent             Severity (0-10 scale):  5/10             Minimal acceptable level (0-10 scale):  0/10  Fatigue:   moderate (++ generalized weakness)  Nausea:  denies  Lack of Appetite:  denies  SOB: denies  Constipation:  denies     Review of Systems:   Otherwise unable to obtain due to poor mentation/lethargy      MEDICATIONS  (STANDING):  apixaban 5 milliGRAM(s) Oral two times a day  cefTRIAXone   IVPB 1000 milliGRAM(s) IV Intermittent every 24 hours  levothyroxine 88 MICROGram(s) Oral daily  lidocaine   4% Patch 1 Patch Transdermal daily  pantoprazole    Tablet 40 milliGRAM(s) Oral before breakfast  polyethylene glycol 3350 17 Gram(s) Oral daily  predniSONE   Tablet 20 milliGRAM(s) Oral daily  senna 2 Tablet(s) Oral at bedtime    MEDICATIONS  (PRN):  acetaminophen     Tablet .. 650 milliGRAM(s) Oral every 6 hours PRN Temp greater or equal to 38C (100.4F), Mild Pain (1 - 3)  bisacodyl 10 milliGRAM(s) Oral at bedtime PRN Constipation  melatonin 3 milliGRAM(s) Oral at bedtime PRN Insomnia  ondansetron Injectable 4 milliGRAM(s) IV Push every 8 hours PRN Nausea and/or Vomiting  oxyCODONE    IR 5 milliGRAM(s) Oral every 4 hours PRN Moderate to Severe Pain (4 - 10)      PHYSICAL EXAM:  Vital Signs Last 24 Hrs  T(C): 36.5 (03 Mar 2025 13:39), Max: 36.7 (03 Mar 2025 05:49)  T(F): 97.7 (03 Mar 2025 13:39), Max: 98.1 (03 Mar 2025 05:49)  HR: 91 (03 Mar 2025 13:39) (66 - 91)  BP: 116/68 (03 Mar 2025 13:39) (108/58 - 118/81)  BP(mean): --  RR: 16 (03 Mar 2025 13:39) (16 - 18)  SpO2: 97% (03 Mar 2025 13:39) (95% - 97%)    Parameters below as of 03 Mar 2025 13:39  Patient On (Oxygen Delivery Method): room air        General: alert  oriented x __2__    lethargic but easily responsive, NAD    Palliative Performance Scale/Karnofsky Score:  http://npcrc.org/files/news/palliative_performance_scale_ppsv2.pdf    HEENT:  EOMI anicteric, pharynx clear, MM moist  Lungs:  overall clear to auscultation, respirations unlabored, no congestion noted  CV: irreg irreg (a fib), normal S1 and S2, no murmur  GI: soft non distended non tender   + normal bowel sounds  : incontinent    Musculoskeletal: weakness x4  in all extremities, ambulatory with assistance but now mostly bedbound, mild LE edema noted  Skin: no abnormal skin lesions or DU noted  Neuro: no focal deficits  Oral intake ability:  full capability, on regular diet      LABS:                        7.9    3.20  )-----------( 145      ( 03 Mar 2025 07:45 )             27.3     03-03    141  |  113[H]  |  19[H]  ----------------------------<  133[H]  4.6   |  23  |  1.23    Ca    11.2[H]      03 Mar 2025 07:45  Phos  2.3     03-03  Mg     1.8     03-03    TPro  7.7  /  Alb  2.3[L]  /  TBili  0.2  /  DBili  x   /  AST  11  /  ALT  11  /  AlkPhos  88  03-03    Urinalysis Basic - ( 03 Mar 2025 07:45 )    Color: x / Appearance: x / SG: x / pH: x  Gluc: 133 mg/dL / Ketone: x  / Bili: x / Urobili: x   Blood: x / Protein: x / Nitrite: x   Leuk Esterase: x / RBC: x / WBC x   Sq Epi: x / Non Sq Epi: x / Bacteria: x        RADIOLOGY & ADDITIONAL STUDIES:    ACC: [id number] EXAM:  CT ABDOMEN AND PELVIS IC   ORDERED BY: ASPEN HOWELL     ACC: [id number] EXAM:  CT CHEST IC   ORDERED BY: ASPEN HOWELL     PROCEDURE DATE:  03/01/2025          INTERPRETATION:  CLINICAL INFORMATION: Injury.    COMPARISON: CT torso 2/8/2025. CT chest 2/10/2025.    CONTRAST/COMPLICATIONS:  IV Contrast: Omnipaque 350 (accession [id number]), IV contrast documented   in unlinked concurrent exam (accession [id number])  90 cc administered   10   cc discarded  Oral Contrast: NONE    PROCEDURE:  CT of the Chest, Abdomen and Pelvis was performed.  Sagittal and coronal reformats were performed.    FINDINGS:Absence of intravenous contrast limits evaluation of vasculature   and visceral organs.    CHEST:  LUNGS AND LARGE AIRWAYS: Patent central airways. Respiratory motion   artifact limits detailed evaluation of the right lower lobe 1.2 cm left   upper lobe and sub-5 mm lung nodule. Mild bilateral dependent   atelectasis. Mild mosaic attenuation with suggestion of interlobular   septal thickening.  PLEURA: No pleural effusion.  VESSELS: Aorta and coronary artery calcifications.  HEART: Heart size is normal. No pericardial effusion.  MEDIASTINUM AND JOEL: No lymphadenopathy.  CHEST WALL AND LOWER NECK: Unchanged lesion containing fat, soft tissue   density and calcifications in the posterior left shoulder, deep to the   deltoid. Extravasation of contrast identified within the left arm.    ABDOMEN AND PELVIS:  LIVER: Indeterminate 1.3 cm hypodensity in the left hepatic lobe.   Additional subcentimeter hypodensities too small to characterize.  BILE DUCTS: Normal caliber.  GALLBLADDER: Cholelithiasis.  SPLEEN: 1.9 cm indeterminate hypodensity in the spleen.  PANCREAS: Within normal limits.  ADRENALS: Within normal limits.  KIDNEYS/URETERS: No hydronephrosis.Bilateral renal cortical and renal   sinus cysts.    BLADDER: Within normal limits.  REPRODUCTIVE ORGANS: Calcified fibroid uterus.    BOWEL: No bowel obstruction. Nonvisualized appendix.  PERITONEUM/RETROPERITONEUM: Within normal limits.  VESSELS: Atherosclerotic changes.  LYMPH NODES: No lymphadenopathy.  ABDOMINAL WALL: Within normal limits.  BONES: Degenerative changes. Reidentified expansile lytic lesions   involving the ribs, visualized spine, pelvic bones, and proximal femurs,   consistent with history of multiple myeloma. Remote appearing deformities   of bilateral ribs. Evaluation of ribs is limited secondary to respiratory   motion artifact. Consider nonemergent dedicated rib series if indicated.    IMPRESSION:    No sequela of acute traumatic injury within the chest, abdomen and pelvis   allowing for the limitation of absence of contrast.    Remote appearing deformities of bilateral ribs. Evaluation of ribs is   limited secondary to respiratory motion artifact. Consider nonemergent   dedicated rib series if indicated.    Mild mosaic attenuation with suggestion of interlobular septal   thickening. Differential includes atypical infection or mild pulmonary   vascular congestion. Recommend clinical correlation.    Extravasation of contrast identified within the left arm. Elevation of   the extremity, cold compresses, evaluation of peripheral pulses and   follow-up with plastic surgery is suggested.    Reidentified expansile lytic lesions involving the ribs, visualized   spine, pelvic bones, and proximal femurs, consistent with history of   multiple myeloma.    Unchanged lesion containing fat, soft tissue density and calcifications   in the posterior left shoulder, deep to the deltoid. Consider nonemergent   MR.    These critical results were discussed via telephone at 3/2/2025 12:56 AM   by Dr. Gil of radiology with Dr. Dumas.    --- End of Report ---      ACC: [id number] EXAM:  CT BRAIN   ORDERED BY: ASPEN HOWELL     ACC: [id number] EXAM:  CT CERVICAL SPINE   ORDERED BY: ASPEN HOWELL     PROCEDURE DATE:  03/01/2025          INTERPRETATION:  CLINICAL INFORMATION: Trauma. Fall.    TECHNIQUE:  Axial CT images were acquired through the head and cervical spine.  Intravenous contrast: None  Two-dimensional reformats were generated.    COMPARISON STUDY: 02/09/2025    FINDINGS:  CT head:    There is no CT evidence of acute intracranial hemorrhage, mass effect,   midline shift, or acute territorial infarct.    The ventricles and sulci   are normal in size and configuration. The basal cisterns are patent.    The visualized paranasal sinuses are clear.    The mastoid air cells and middle ear cavities are grossly clear.    Numerous calvarial lucencies again noted, consistent with history of   multiple myeloma.      CT cervical spine:  Diagnostic accuracy is limited secondary to patient motion.    There is straightening of the cervical lordosis.  There is noCT evidence of an acute cervical spine fracture or traumatic   malalignment.  Scattered lytic lesions consistent with history of multiple myeloma.   Lytic expansion of the left first rib. Chronic right rib fracture.  The paraspinous soft tissues are unremarkable within limits of CT scan.    Degenerative changes:  There are multilevel degenerative changes characterized by disc   osteophyte complexes and facet and uncinate hypertrophy with resultant   mild to moderate multilevel central canal and neural foraminal stenosis.    Incidental findings:  Visualized soft tissues of the neck are unremarkable.  Visualized lung apices are unremarkable.      IMPRESSION:    CT BRAIN:    No evidence of acute intracranial hemorrhage, midline shift or CT   evidence of acute territorial infarct.    If the patient's symptoms persist, consider short interval follow-up head   CT or brain MRI if there are no MRI contraindications.      CT CERVICAL SPINE:    No acute cervical fracture or traumatic malalignment.    MRI would be required to evaluate the ligamentous structures at higher   sensitivity as well as for better evaluation of the cervical canal and   its contents.    --- End of Report ---    TRANSTHORACIC ECHOCARDIOGRAM REPORT  ________________________________________________________________________________                                      _______    Pt. Name:       ELLIOTT JONES Study Date:    2/9/2025  (from previous [facility] admission)  MRN:            [id number]   YOB: 1940  Accession #:    [id number]    Age:           84 years  _______________________________________________________________________________________     CONCLUSIONS:      1. Left ventricular cavity is normal in size. Left ventricular systolic function is normal. There are no regional wall motion abnormalities seen.   2. There is increased LV mass and concentric hypertrophy.   3. Mild left ventricular hypertrophy.   4. There is mild (grade 1) left ventricular diastolic dysfunction.   5. Normal right ventricular cavity size, with normal wall thickness, and normal right ventricular systolic function. Tricuspid annular plane systolic excursion (TAPSE) is 3.0 cm (normal >=1.7 cm).   6. Mild mitral regurgitation.   7. Normal left and right atrial size.   8. Moderate tricuspid regurgitation.   9. Mild pulmonic regurgitation.  10. Estimated pulmonary artery systolic pressure is 80 mmHg, consistent with severe pulmonary hypertension.  11. The inferior vena cava is dilated measuring 2.40 cm in diameter, (dilated >2.1cm) with abnormal inspiratory collapse (abnormal <50%) consistent with elevated right atrial pressure (~15, range 10-20mmHg).  12. No pericardial effusion seen.  13. No prior echocardiogram is available for comparison.

## 2025-03-03 NOTE — PHYSICAL THERAPY INITIAL EVALUATION ADULT - PERTINENT HX OF CURRENT PROBLEM, REHAB EVAL
Patient is an 83 y/o F, with PMH Afib on eliquis, multiple myeloma, HTN, hypothyroidism presented to ED for mechanical fall. CTH negative, found to have corrected Ca 13.9 iso MM and Cr increase 0.3 with positive UA. Admitted to medicine for MEHRAN, UTI, and management of acute fall.

## 2025-03-03 NOTE — CONSULT NOTE ADULT - CONVERSATION DETAILS
Telephonic family meeting held with daughter to discuss prognosis, ACP, goals of care, and treatment preferences.   Daughter engaged in conversation voluntarily.  Supportive role of palliative care team explained.  Current hospital course and anticipated disease trajectory of patient’s stage IV malignant myeloma, hypercalcemia, and progressive debility and weakness  discussed with daughter in detail.     Patient's previous treatment history briefly reviewed with daughter.  Patient previously treated with Ninlaro (ixazomib), but had difficulty tolerating, ? had to stop due to neutropenia.  Daughter reports that patient with marked functional decline since last hospitalization for syncope and weakness 2-3 weeks ago.  Patient is now minimally ambulatory, mostly bedbound, requiring extensive assistance with ADLs.  Daughter is aware that patient has widespread malignant disease/mets and that her overall prognosis is poor, but her primary goal remains RAJINDER for now in hopes that patient can at least get 'a little bit stronger" and she is better able to take care of patient at home.    Counseled daughter that patient essentially has end stage malignancy in the setting of her malignant hypercalcemia.  Also counseled her that patient has very poor functional status and I do not see how she will be a candidate for any future cancer directed treatment.  Advised that even with aggressive treatment, hypercalcemia will continue to recur with time.  Advised her that hypercalcemia is a particularly poor prognostic sign, and that it usually carries a prognosis of weeks to ~ 3 months in the setting of advanced cancer.  Also advised daughter that patient is unlikely to have significant functional improvement with rehab. and that patient is hospice appropriate. Introduced hospice philosophy and services, including focus on comfort goals of care and symptom management, along with avoidance of future hospitalization.  Daughter stated that if patient's time is limited, she ideally would like for patient to come home, but feels that she does not have sufficient resources to take care of patient at this time (patient likely would need 24 hour care, does not have Medicaid).  Discussed broad options of RAJINDER vs transitioning to hospice services now; after further discussion, daughter agreed to proceed with current plans for RAJINDER, followed by transition to hospice (at home vs LTC) in the near future.    Advance directives reviewed; daughter remains in agreement with previous orders for DNR and DNI.  Also discussed ongoing use of PRN opioids for pain/symptom management; daughter's concerns were addressed.  Emotional support provided, daughter also agreed to referral to [facility] for additional information and support.    Daughter was appreciative of the conversation, and all of their questions were answered

## 2025-03-03 NOTE — CONSULT NOTE ADULT - PROBLEM SELECTOR RECOMMENDATION 2
In setting of stage IV multiple myeloma with widespread lytic lesions  Poor prognostic sign, generally carries prognosis of < 3 months in setting of end stage cancer    Mental status/calcium level slowly improving s/p Zometa x 1 dose and IV hydration    Patient hospice appropriate, see C discussion above, daughter wishes to proceed with RAJINDER at this time    Remainder of management as per primary medical team

## 2025-03-03 NOTE — CONSULT NOTE ADULT - PROBLEM SELECTOR RECOMMENDATION 5
Clinically stable, rate controlled  Continue apixaban  Remainder of management as per primary medical team

## 2025-03-03 NOTE — PHYSICAL THERAPY INITIAL EVALUATION ADULT - GENERAL OBSERVATIONS, REHAB EVAL
pt received supine in bed, alert but c/o pain to low back and abdominal area, saline lock intact, +Premafit, L UE edema; partially cooperative to PT evaluation

## 2025-03-04 DIAGNOSIS — Z51.5 ENCOUNTER FOR PALLIATIVE CARE: ICD-10-CM

## 2025-03-04 DIAGNOSIS — G89.3 NEOPLASM RELATED PAIN (ACUTE) (CHRONIC): ICD-10-CM

## 2025-03-04 DIAGNOSIS — E83.52 HYPERCALCEMIA: ICD-10-CM

## 2025-03-04 DIAGNOSIS — R53.81 OTHER MALAISE: ICD-10-CM

## 2025-03-04 LAB
ALBUMIN SERPL ELPH-MCNC: 2.3 G/DL — LOW (ref 3.5–5)
ALP SERPL-CCNC: 94 U/L — SIGNIFICANT CHANGE UP (ref 40–120)
ALT FLD-CCNC: 12 U/L DA — SIGNIFICANT CHANGE UP (ref 10–60)
ANION GAP SERPL CALC-SCNC: 3 MMOL/L — LOW (ref 5–17)
ANISOCYTOSIS BLD QL: SLIGHT — SIGNIFICANT CHANGE UP
AST SERPL-CCNC: 12 U/L — SIGNIFICANT CHANGE UP (ref 10–40)
BASOPHILS # BLD AUTO: 0 K/UL — SIGNIFICANT CHANGE UP (ref 0–0.2)
BASOPHILS NFR BLD AUTO: 0 % — SIGNIFICANT CHANGE UP (ref 0–2)
BILIRUB SERPL-MCNC: 0.2 MG/DL — SIGNIFICANT CHANGE UP (ref 0.2–1.2)
BUN SERPL-MCNC: 18 MG/DL — SIGNIFICANT CHANGE UP (ref 7–18)
CALCIUM SERPL-MCNC: 10 MG/DL — SIGNIFICANT CHANGE UP (ref 8.4–10.5)
CHLORIDE SERPL-SCNC: 113 MMOL/L — HIGH (ref 96–108)
CO2 SERPL-SCNC: 23 MMOL/L — SIGNIFICANT CHANGE UP (ref 22–31)
CREAT SERPL-MCNC: 1.23 MG/DL — SIGNIFICANT CHANGE UP (ref 0.5–1.3)
EGFR: 44 ML/MIN/1.73M2 — LOW
EGFR: 44 ML/MIN/1.73M2 — LOW
EOSINOPHIL # BLD AUTO: 0.2 K/UL — SIGNIFICANT CHANGE UP (ref 0–0.5)
EOSINOPHIL NFR BLD AUTO: 6 % — SIGNIFICANT CHANGE UP (ref 0–6)
GLUCOSE SERPL-MCNC: 119 MG/DL — HIGH (ref 70–99)
HCT VFR BLD CALC: 28.2 % — LOW (ref 34.5–45)
HGB BLD-MCNC: 8 G/DL — LOW (ref 11.5–15.5)
HYPOCHROMIA BLD QL: SLIGHT — SIGNIFICANT CHANGE UP
LYMPHOCYTES # BLD AUTO: 1.12 K/UL — SIGNIFICANT CHANGE UP (ref 1–3.3)
LYMPHOCYTES # BLD AUTO: 33 % — SIGNIFICANT CHANGE UP (ref 13–44)
MAGNESIUM SERPL-MCNC: 1.9 MG/DL — SIGNIFICANT CHANGE UP (ref 1.6–2.6)
MANUAL SMEAR VERIFICATION: SIGNIFICANT CHANGE UP
MCHC RBC-ENTMCNC: 25.7 PG — LOW (ref 27–34)
MCHC RBC-ENTMCNC: 28.4 G/DL — LOW (ref 32–36)
MCV RBC AUTO: 90.7 FL — SIGNIFICANT CHANGE UP (ref 80–100)
MONOCYTES # BLD AUTO: 0.24 K/UL — SIGNIFICANT CHANGE UP (ref 0–0.9)
MONOCYTES NFR BLD AUTO: 7 % — SIGNIFICANT CHANGE UP (ref 2–14)
MYELOCYTES NFR BLD: 1 % — HIGH (ref 0–0)
NEUTROPHILS # BLD AUTO: 1.76 K/UL — LOW (ref 1.8–7.4)
NEUTROPHILS NFR BLD AUTO: 52 % — SIGNIFICANT CHANGE UP (ref 43–77)
NRBC # BLD: 1 /100 WBCS — HIGH (ref 0–0)
NRBC BLD-RTO: 1 /100 WBCS — HIGH (ref 0–0)
PHOSPHATE SERPL-MCNC: 1.7 MG/DL — LOW (ref 2.5–4.5)
PLAT MORPH BLD: NORMAL — SIGNIFICANT CHANGE UP
PLATELET # BLD AUTO: 132 K/UL — LOW (ref 150–400)
PLATELET COUNT - ESTIMATE: ABNORMAL
POIKILOCYTOSIS BLD QL AUTO: SLIGHT — SIGNIFICANT CHANGE UP
POTASSIUM SERPL-MCNC: 3.4 MMOL/L — LOW (ref 3.5–5.3)
POTASSIUM SERPL-SCNC: 3.4 MMOL/L — LOW (ref 3.5–5.3)
PROT SERPL-MCNC: 7.9 G/DL — SIGNIFICANT CHANGE UP (ref 6–8.3)
RBC # BLD: 3.11 M/UL — LOW (ref 3.8–5.2)
RBC # FLD: 23.3 % — HIGH (ref 10.3–14.5)
RBC BLD AUTO: ABNORMAL
SODIUM SERPL-SCNC: 139 MMOL/L — SIGNIFICANT CHANGE UP (ref 135–145)
VARIANT LYMPHS # BLD: 1 % — SIGNIFICANT CHANGE UP (ref 0–6)
VARIANT LYMPHS NFR BLD MANUAL: 1 % — SIGNIFICANT CHANGE UP (ref 0–6)
WBC # BLD: 3.38 K/UL — LOW (ref 3.8–10.5)
WBC # FLD AUTO: 3.38 K/UL — LOW (ref 3.8–10.5)

## 2025-03-04 PROCEDURE — 99233 SBSQ HOSP IP/OBS HIGH 50: CPT

## 2025-03-04 PROCEDURE — 99497 ADVNCD CARE PLAN 30 MIN: CPT | Mod: 25

## 2025-03-04 PROCEDURE — 99233 SBSQ HOSP IP/OBS HIGH 50: CPT | Mod: GC

## 2025-03-04 RX ORDER — POTASSIUM PHOSPHATE, MONOBASIC POTASSIUM PHOSPHATE, DIBASIC INJECTION, 236; 224 MG/ML; MG/ML
30 SOLUTION, CONCENTRATE INTRAVENOUS ONCE
Refills: 0 | Status: COMPLETED | OUTPATIENT
Start: 2025-03-04 | End: 2025-03-04

## 2025-03-04 RX ADMIN — LIDOCAINE HYDROCHLORIDE 1 PATCH: 20 JELLY TOPICAL at 12:31

## 2025-03-04 RX ADMIN — Medication 3 MILLIGRAM(S): at 22:37

## 2025-03-04 RX ADMIN — LIDOCAINE HYDROCHLORIDE 1 PATCH: 20 JELLY TOPICAL at 19:33

## 2025-03-04 RX ADMIN — OXYCODONE HYDROCHLORIDE 5 MILLIGRAM(S): 30 TABLET ORAL at 12:30

## 2025-03-04 RX ADMIN — OXYCODONE HYDROCHLORIDE 5 MILLIGRAM(S): 30 TABLET ORAL at 10:21

## 2025-03-04 RX ADMIN — Medication 2 TABLET(S): at 22:32

## 2025-03-04 NOTE — PROGRESS NOTE ADULT - TREATMENT GUIDELINES
DNR/Comfort measures only/Do not re-hospitalize/No blood draws/No artificial nutrition/No antibiotics/IV fluid trial/DNI

## 2025-03-04 NOTE — PROGRESS NOTE ADULT - CONVERSATION DETAILS
Additional face to face  family meeting held with daughter at bedside x 20 minutes (as separately identifiable service) to discuss prognosis, ACP, goals of care, and treatment preferences.   [facility] SW also joined in the meeting.  Daughter engaged in conversation voluntarily.      Daughter states that she spoke with Dr. Colon earlier this morning, and now wishes to proceed directly to hospice, acknowledges that patient is highly unlikely to improve with RAJINDER.  Again reinforced that patient likely has a prognosis of weeks to < 3 months.  Options for hospice discussed; daughter requested NH/LTC placement with hospice, SW aware.    Advance directives reviewed--daughter remains in agreement with DNR/DNI, and she also is in agreement that patient would not want a feeding tube.  Daughter also now in agreement with formal transition to comfort oriented care/CMO, does not want further blood draws, no further testing, no further antibiotics (ceftriaxone completed earlier today), and no ICU or further escalation of care.  She would be open to limited IV hydration as necessary for comfort.  Daughter also in agreement for maximum pain and symptom relief, but for now wishes to continue current regimen of PRN oxycodone, is not ready for long acting opioids/Fentanyl patch at this time.  MOLST orders updated as below.    Daughter was  appreciative of the conversation, and all of her  questions were answered.

## 2025-03-04 NOTE — PROGRESS NOTE ADULT - PROBLEM SELECTOR PLAN 8
hx of HTN on home norvasc 5, losartan 50  - hold home meds iso soft BP
hx of HTN on home norvasc 5, losartan 50  COMFORT CARE MEASURES ONLY, MEWS EXEMPT  medications discontinued

## 2025-03-04 NOTE — PROGRESS NOTE ADULT - ATTENDING COMMENTS
[place]  ID [id number]. Patient seen and examined at bedside this morning. She says no complains, nor any dyspnea. Otherwise she had been sleeping comfortably.    Am labs, vitals reviewed as above. VSS, afebrile. Hgb stable, K 3.4, phos 1.7.    PE – elderly female, lying comfortably in bed, NAD, RRR, lungs grossly clear, abd soft ntnd, extremities wwp     A/P   84F, with Afib on eliquis, multiple myeloma, HTN, hypothyroidism who p/w  mechanical fall; CTH negative, found to have corrected Ca 13.9 iso MM and Cr increase 0.3 with positive UA, admitted MEHRAN, UTI, and management of acute fall.   #Fall   #Ambulatory dysfunction   #Multiple Myeloma   #Hypercalcemia   #Afib   #Hypothyroidism     -discussed with [facility]/Dr Sharma, after further discussion with family, they have decided to make her comfort care with plans for hospice   -may dc eliquis, given hospice and recently having falls   -c/w prednisone   -heme/onc following, she is not a candidate for further therapy and rec hospice   -c/w bowel regimen   -pain medications prn   -no further blood draws   -IVF held iso of intermittent dyspnea yesterday   -s/p course of CTX, Cr normalized   -dispo now pending hospice

## 2025-03-05 VITALS
HEART RATE: 93 BPM | DIASTOLIC BLOOD PRESSURE: 84 MMHG | OXYGEN SATURATION: 99 % | RESPIRATION RATE: 18 BRPM | TEMPERATURE: 98 F | SYSTOLIC BLOOD PRESSURE: 155 MMHG

## 2025-03-05 PROCEDURE — 80048 BASIC METABOLIC PNL TOTAL CA: CPT

## 2025-03-05 PROCEDURE — 97162 PT EVAL MOD COMPLEX 30 MIN: CPT

## 2025-03-05 PROCEDURE — T1013: CPT

## 2025-03-05 PROCEDURE — 36415 COLL VENOUS BLD VENIPUNCTURE: CPT

## 2025-03-05 PROCEDURE — 85025 COMPLETE CBC W/AUTO DIFF WBC: CPT

## 2025-03-05 PROCEDURE — 96374 THER/PROPH/DIAG INJ IV PUSH: CPT

## 2025-03-05 PROCEDURE — 71260 CT THORAX DX C+: CPT | Mod: MC

## 2025-03-05 PROCEDURE — 85730 THROMBOPLASTIN TIME PARTIAL: CPT

## 2025-03-05 PROCEDURE — 71250 CT THORAX DX C-: CPT | Mod: MC

## 2025-03-05 PROCEDURE — 84484 ASSAY OF TROPONIN QUANT: CPT

## 2025-03-05 PROCEDURE — 83735 ASSAY OF MAGNESIUM: CPT

## 2025-03-05 PROCEDURE — 83605 ASSAY OF LACTIC ACID: CPT

## 2025-03-05 PROCEDURE — 85610 PROTHROMBIN TIME: CPT

## 2025-03-05 PROCEDURE — 84100 ASSAY OF PHOSPHORUS: CPT

## 2025-03-05 PROCEDURE — 72125 CT NECK SPINE W/O DYE: CPT | Mod: MC

## 2025-03-05 PROCEDURE — 93005 ELECTROCARDIOGRAM TRACING: CPT

## 2025-03-05 PROCEDURE — 83690 ASSAY OF LIPASE: CPT

## 2025-03-05 PROCEDURE — 99285 EMERGENCY DEPT VISIT HI MDM: CPT | Mod: 25

## 2025-03-05 PROCEDURE — 99239 HOSP IP/OBS DSCHRG MGMT >30: CPT | Mod: GC

## 2025-03-05 PROCEDURE — 71045 X-RAY EXAM CHEST 1 VIEW: CPT

## 2025-03-05 PROCEDURE — 99233 SBSQ HOSP IP/OBS HIGH 50: CPT

## 2025-03-05 PROCEDURE — 74177 CT ABD & PELVIS W/CONTRAST: CPT | Mod: MC

## 2025-03-05 PROCEDURE — 80053 COMPREHEN METABOLIC PANEL: CPT

## 2025-03-05 PROCEDURE — 70450 CT HEAD/BRAIN W/O DYE: CPT | Mod: MC

## 2025-03-05 PROCEDURE — 81001 URINALYSIS AUTO W/SCOPE: CPT

## 2025-03-05 RX ORDER — LEVOTHYROXINE SODIUM 25 UG/1
1 TABLET ORAL
Refills: 0 | DISCHARGE

## 2025-03-05 RX ORDER — LOSARTAN POTASSIUM 100 MG
1 TABLET ORAL
Refills: 0 | DISCHARGE

## 2025-03-05 RX ORDER — OXYCODONE HYDROCHLORIDE 30 MG/1
5 TABLET ORAL EVERY 6 HOURS
Refills: 0 | Status: DISCONTINUED | OUTPATIENT
Start: 2025-03-05 | End: 2025-03-05

## 2025-03-05 RX ORDER — AMLODIPINE BESYLATE 5 MG
1 TABLET ORAL
Refills: 0 | DISCHARGE

## 2025-03-05 RX ADMIN — Medication 650 MILLIGRAM(S): at 15:26

## 2025-03-05 RX ADMIN — OXYCODONE HYDROCHLORIDE 5 MILLIGRAM(S): 30 TABLET ORAL at 12:07

## 2025-03-05 RX ADMIN — OXYCODONE HYDROCHLORIDE 5 MILLIGRAM(S): 30 TABLET ORAL at 12:49

## 2025-03-05 RX ADMIN — LIDOCAINE HYDROCHLORIDE 1 PATCH: 20 JELLY TOPICAL at 12:10

## 2025-03-05 RX ADMIN — LIDOCAINE HYDROCHLORIDE 1 PATCH: 20 JELLY TOPICAL at 00:45

## 2025-03-05 RX ADMIN — PREDNISONE 20 MILLIGRAM(S): 20 TABLET ORAL at 05:54

## 2025-03-05 NOTE — PROGRESS NOTE ADULT - TIME BILLING
Interim chart review, examination of patient, collaboration with primary medical team, and documentation in the medical record.    Total encounter time is EXCLUSIVE of time spent on ACP discussion
Interim chart review, examination of patient, discussion with family at bedside, collaboration with primary medical team, and documentation in the medical record.
Time spent includes direct patient care (interview and examination of patient), discussion with other providers, support staff and/or patient's family members, review of medical records, ordering diagnostic tests and analyzing results, and documentation.

## 2025-03-05 NOTE — DISCHARGE NOTE PROVIDER - NSDCCPCAREPLAN_GEN_ALL_CORE_FT
PRINCIPAL DISCHARGE DIAGNOSIS  Diagnosis: Multiple myeloma  Assessment and Plan of Treatment: You presented to the ED after a mechanical fall. Your CT Head was negative but you were found to have elevated calcium levels, in the setting of your advancing multiple myeloma. You [facility] with [facility]e [facility] for [facility] condition and completed a course of Ninlaro ~6 wks ago. You had a CT C/A/P which showed lytic lesions involving ribs, spine pelvic bones, proximal femurs and Multiple subacute healing R rib fractures. Your hypercalcemia was managed with IV fluids.   After discussion with Hematology/Oncology and Palliative for progression of multiple myeloma, the decision was made with family to transition you to long term hospice care.      SECONDARY DISCHARGE DIAGNOSES  Diagnosis: Frequent falls  Assessment and Plan of Treatment: See above    Diagnosis: Urinary tract infection  Assessment and Plan of Treatment: In the hospital, you were treated with 3 days of antibiotics for a urinary tract infection.    Diagnosis: Hypercalcemia  Assessment and Plan of Treatment:     Diagnosis: Hypertension  Assessment and Plan of Treatment: You have a history of HTN on home norvasc 5, losartan 50, which were discontinued as part of comfort care and hospice measures    Diagnosis: Hypothyroidism  Assessment and Plan of Treatment: You have a history of hypothyroidism on home synthroid 88mcg, which was discontinued as part of comfort care and hospice measures.    Diagnosis: Atrial fibrillation  Assessment and Plan of Treatment: You have a history of Afib on Eliquis, which was discontinued as part of comfort care and hospice measures.     PRINCIPAL DISCHARGE DIAGNOSIS  Diagnosis: Multiple myeloma  Assessment and Plan of Treatment: You presented to the ED after a mechanical fall. Your CT Head was negative but you were found to have elevated calcium levels, in the setting of your advancing multiple myeloma. You follow with Heme onc Dr Colon ([facility]) for this condition and completed a course of Ninlaro ~6 wks ago. You had a CT C/A/P which showed lytic lesions involving ribs, spine pelvic bones, proximal femurs and Multiple subacute healing R rib fractures. Your hypercalcemia was managed with IV fluids.   For pain you were given oxycodone 5mg.  After discussion with Hematology/Oncology and Palliative for progression of multiple myeloma, the decision was made with family to transition you to long term hospice care.      SECONDARY DISCHARGE DIAGNOSES  Diagnosis: Frequent falls  Assessment and Plan of Treatment: See above    Diagnosis: Urinary tract infection  Assessment and Plan of Treatment: In the hospital, you were treated with 3 days of antibiotics for a urinary tract infection.    Diagnosis: Hypercalcemia  Assessment and Plan of Treatment: This occurred as a progression of your multipel myeloma. you were initially given fluids and medication to help lower this, however after further discussion you and family opted to go for comfort care with hospice.    Diagnosis: Hypertension  Assessment and Plan of Treatment: You have a history of HTN on home norvasc 5, losartan 50, which were discontinued as part of comfort care and hospice measures    Diagnosis: Hypothyroidism  Assessment and Plan of Treatment: You have a history of hypothyroidism on home synthroid 88mcg, which was discontinued as part of comfort care and hospice measures.    Diagnosis: Atrial fibrillation  Assessment and Plan of Treatment: You have a history of Afib on Eliquis, which was discontinued as part of comfort care and hospice measures.

## 2025-03-05 NOTE — DISCHARGE NOTE PROVIDER - HOSPITAL COURSE
Pt is an 84F, from home, ambulates minimally with walker, PMH Afib on eliquis, multiple myeloma, HTN, hypothyroidism, who presented to ED for mechanical fall. CTH negative, found to have corrected Ca 13.9 iso progressing multiple myeloma and Cr increase 0.3 with positive UA. Admitted to medicine for MEHRAN, UTI, and management of acute fall. Palliative, Heme Onc consulted. Pt transitioned to COMFORT CARE MEASURES ONLY, MEWS EXEMPT with discharge to hospice care.     Pt with history of multiple myeloma, Heme onc Dr Colon ([facility]). Completed course of Ninlaro ~6 wks ago. Currently on Revlimid. Noted CT C/A/P - Reidentified expansile lytic lesions involving ribs, spine pelvic bones, proximal femurs. Multiple subacute healing R rib fractures. No acute pathologic fracture. Hypodensities in the left hepatic lobe and spleen. 1.2 cm RLL pulmonary nodule.  Currently on prednisone 20mg daily.     Hematology/Oncology and Palliative consulted for concern for progression of multiple myeloma as pt is reaching hypercalcemia stage. Decision made with family to transition patient to long term hospice care.     Hypercalcemia improved mildly with IVF and Zolendronic acid.   Treated for Acute UTI, treated with CTX 3 day course.   Pt with hx of Afib on Eliquis, which was discontinued as part of comfort care and hospice measures.   Pt with hx of hypothyroid on home synthroid 88mcg, which was discontinued as part of comfort care and hospice measures.   Pt with hx of HTN on home norvasc 5, losartan 50, which were discontinued as part of comfort care and hospice measures.      Pt is an 84F, from home, ambulates minimally with walker, PMH Afib on eliquis, multiple myeloma, HTN, hypothyroidism, who presented to ED for mechanical fall. CTH negative, found to have corrected Ca 13.9 iso progressing multiple myeloma and Cr increase 0.3 with positive UA. Admitted to medicine for MEHRAN, UTI, and management of acute fall. Palliative, Heme Onc consulted. Pt transitioned to COMFORT CARE MEASURES ONLY, MEWS EXEMPT with discharge to hospice care.   Pt with history of multiple myeloma, Heme onc Dr Colon ([facility]). Completed course of Ninlaro ~6 wks ago. Recently on Revlimid. Noted CT C/A/P - Reidentified expansile lytic lesions involving ribs, spine pelvic bones, proximal femurs. Multiple subacute healing R rib fractures. No acute pathologic fracture. Hypodensities in the left hepatic lobe and spleen. 1.2 cm RLL pulmonary nodule.  Currently continued on prednisone 20mg daily.   Noted to be hypercalcemic as well, given zolendronic acid and IVF hydration. Hematology/Oncology and Palliative consulted for concern for progression of multiple myeloma as pt is reaching hypercalcemia stage. Decision made with family to transition patient to long term hospice care.     Hypercalcemia improved mildly with IVF and Zolendronic acid.   Treated for Acute UTI, treated with CTX 3 day course.   Pt with hx of Afib on Eliquis, which was discontinued as part of comfort care and hospice measures.   Pt with hx of hypothyroid on home synthroid 88mcg, which was discontinued as part of comfort care and hospice measures.   Pt with hx of HTN on home norvasc 5, losartan 50, which were discontinued as part of comfort care and hospice measures.

## 2025-03-05 NOTE — PROGRESS NOTE ADULT - PROBLEM SELECTOR PLAN 1
Followed by Dr. Colon at [facility], consult note from Dr. Silver appreciated   Previously on treatment with Ninlaro and Revlimid, unable to tolerate due to pancytopenia  Imaging now shows extensive bony metastatic disease, patient also with accelerated functional decline over last 3 weeks, now admitted with worsening mentation/lethargy and malignant hypercalcemia.    Patient is now ECOG 4, essentially bedbound, with PPS score of 30%.  Note from Dr. Colon appreciated, also spoke with him directly--we are in agreement that patient is NOT a candidate for any  future cancer directed treatment.  Patient is hospice appropriate with an estimated prognosis of weeks to < 3 months.    See GOC discussions from 3/3 and 3/4--daughter expressed understanding of poor prognosis, now wishes to proceed directly to NH placement with hospice, [facility] SW aware      MOLST DNR/DNI in place  Continue supportive care.
hx of recent falls  presented after unwitnessed mechanical fall from bed  found on floor 1 hour after incident  denies hitting of head, LOC, seizure, dyspnea chest pain, palpitations, visual changes, prior or after the fall  CTH negative for acute pathology  f/u PT eval > RAJINDER  fall precautions  COMFORT CARE MEASURES ONLY, MEWS EXEMPT
hx of recent falls  presented after unwitnessed mechanical fall from bed  found on floor 1 hour after incident  denies hitting of head, LOC, seizure, dyspnea chest pain, palpitations, visual changes, prior or after the fall  CTH negative for acute pathology  f/u PT eval > RAJINDER  fall precautions  hold Gabapentin 300 TID iso lethargy
Followed by Dr. Colon at [facility], consult note from Dr. Silver appreciated   Previously on treatment with Ninlaro and Revlimid, unable to tolerate due to pancytopenia  Imaging now shows extensive bony metastatic disease, patient also with accelerated functional decline over last 3 weeks, now admitted with worsening mentation/lethargy and malignant hypercalcemia.    Patient is now ECOG 4, essentially bedbound, with PPS score of 30%.  Note from Dr. Colon appreciated, also spoke with him directly--we are in agreement that patient is NOT a candidate for any  future cancer directed treatment.  Patient is hospice appropriate with an estimated prognosis of weeks to < 3 months.    See GOC discussions form 3/3 and above--daughter expressed understanding of poor prognosis, now wishes to proceed directly to NH placement with hospice, [facility] SW aware      MOLST DNR/DNI in place  Continue supportive care.

## 2025-03-05 NOTE — PROGRESS NOTE ADULT - PROBLEM SELECTOR PLAN 5
baseline Cr. 0.7-.08  p/w Cr 1.21 > 1.23  CTAP showing bladder wnl, patient making urine  likely pre-renal iso dehydration
Clinically stable, rate controlled  Now CMO/MEWS exempt, for LTC placement with hospice  May continue apixaban if in alignment with family's goals of care  Remainder of management as per primary medical team.
Clinically stable, rate controlled  Now CMO/MEWS exempt, for LTC placement with hospice  May continue apixaban if in alignment with family's goals of care  Remainder of management as per primary medical team.
baseline Cr. 0.7-.08  p/w Cr 1.21 > 1.23  CTAP showing bladder wnl, patient making urine  likely pre-renal iso dehydration  s/p IVF  monitor BMP

## 2025-03-05 NOTE — PROGRESS NOTE ADULT - PROVIDER SPECIALTY LIST ADULT
Heme/Onc
Heme/Onc
Internal Medicine
Internal Medicine
Heme/Onc
Palliative Care
Palliative Care
Internal Medicine

## 2025-03-05 NOTE — PROGRESS NOTE ADULT - PROBLEM SELECTOR PLAN 3
Completed 3 day course of IV ceftriaxone  Remainder of management as per primary medical team.
Completed 3 day course of IV ceftriaxone  Remainder of management as per primary medical team.
Corrected Ca 13.9 iso MM > 12.8  trending down  s/p IVF, zoledronic acid x1  started maintenance IVF > held iso intermittent SOB  f/u CXR  monitor CMP  trend Ca daily
Corrected Ca 13.9 iso MM > now trending down  trending down  s/p IVF, zoledronic acid x1  COMFORT CARE MEASURES ONLY, MEWS EXEMPT

## 2025-03-05 NOTE — DISCHARGE NOTE NURSING/CASE MANAGEMENT/SOCIAL WORK - PATIENT PORTAL LINK FT
You can access the FollowMyHealth Patient Portal offered by [facility] by registering at the following website: [email or web address]. By joining [employer]’s FollowMyHealth portal, you will also be able to view your health information using other applications (apps) compatible with our system.

## 2025-03-05 NOTE — DISCHARGE NOTE PROVIDER - ATTENDING DISCHARGE PHYSICAL EXAMINATION:
Patient seen and examined at bedside, with daughter at bedside to assist with translation  Mild distress, points to back as source of pain  NC/AT  RRR, +s1/s2  lungs grossly clear  abd soft ntnd  extremities wwp

## 2025-03-05 NOTE — DISCHARGE NOTE PROVIDER - NSDCMRMEDTOKEN_GEN_ALL_CORE_FT
lidocaine 4% topical film: Apply topically to affected area once a day  MiraLax oral powder for reconstitution: 17 gram(s) orally once a day  Narcan 4 mg/0.1 mL nasal spray: 1 spray(s) intranasally once a day as needed for respiratory depression For opioid overdose: monitor for respiratory depression or altered mentation  Oxaydo 5 mg oral tablet: 1 tab(s) orally every 8 hours as needed for  severe pain MDD: 15 mg  predniSONE 20 mg oral tablet: 1 tab(s) orally once a day

## 2025-03-05 NOTE — DISCHARGE NOTE NURSING/CASE MANAGEMENT/SOCIAL WORK - FINANCIAL ASSISTANCE
[facility] provides services at a reduced cost to those who are determined to be eligible through [facility]’s financial assistance program. Information regarding [facility]’s financial assistance program can be found by going to [email or web address] or by calling [phone].

## 2025-03-05 NOTE — DISCHARGE NOTE PROVIDER - CARE PROVIDER_API CALL
Refugio Kwon.  Internal Medicine  [address], [address]  [place] [address]  Phone: [phone]  Fax: [phone]  Follow Up Time:

## 2025-03-05 NOTE — PROGRESS NOTE ADULT - ASSESSMENT
84 year old female with MM on revlimid and ninlaro had pancytopenia from the treatment.  she pipo eliquis for Afib.  she was a dmiited for a fall.  Ca12.4 was noted with albumin 2.4.  zometa and IVF were given.  CT showed multiple mets in bones.    1. MM  seems not able to tolerate treatment well  f/u as outpt    2. hypercalcemia  had zometa and IVF  she is still lethargic  constipated, will give enema  will monitor  again, she may need new treatment    3 bone mets
· Assessment	  84 year old female with MM on revlimid and ninlaro had pancytopenia from the treatment.  she pipo eliquis for Afib.  she was a dmiited for a fall.  Ca12.4 was noted with albumin 2.4.  zometa and IVF were given.  CT showed multiple mets in bones.    1. MM  awake but confused  seems not able to tolerate treatment well  Patient is not a candidate for further therapy in her condition.  She will benefit from hospice.  Patient's daughter is agreeable.    Follow up with palliative care transition to hospice.     2. hypercalcemia  had zometa and IVF  she is still confused.  constipated, will give enema  will monitor  calcium improving. 10 today  
84 year old female with MM on revlimid and ninlaro had pancytopenia from the treatment.  she pipo eliquis for Afib.  she was a dmiited for a fall.  Ca12.4 was noted with albumin 2.4.  zometa and IVF were given.  CT showed multiple mets in bones.    1. MM  seems not able to tolerate treatment well  Patient is not a candidate for further therapy in her condition.  She will benefit from hospice.  Patient's daughter is agreeable.    Follow up with palliative care transition to hospice.     2. hypercalcemia  had zometa and IVF  she is still confused.  constipated, will give enema  will monitor  calcium improving.     Will cont to follow
84F, from home, ambulates minimally with walker, PMH Afib on eliquis, multiple myeloma, HTN, hypothyroidism presented to ED for mechanical fall. CTH negative, found to have corrected Ca 13.9 iso MM and Cr increase 0.3 with positive UA. Admitted to medicine for MEHRAN, UTI, and management of acute fall.
84F, from home, ambulates minimally with walker, PMH Afib on eliquis, multiple myeloma, HTN, hypothyroidism presented to ED for mechanical fall. CTH negative, found to have corrected Ca 13.9 iso MM and Cr increase 0.3 with positive UA. Admitted to medicine for MEHRAN, UTI, and management of acute fall. Palliative, Heme Onc consulted. Pt transitioned to COMFORT CARE MEASURES ONLY, MEWS EXEMPT. Pending [facility] hospice placement.

## 2025-03-05 NOTE — PROGRESS NOTE ADULT - PROBLEM SELECTOR PLAN 7
hx of hypothyroid on home synthroid 88mcg  - c/w home meds.
As above.  85 yo with stage multiple myeloma, widespread lytic lesions/mets, POD despite Ninlaro, now ECOG 4 with poor performance status, PPS 30%, admitted with hypercalcemia.  She is not a candidate for any further cancer directed treatment.  Patient is hospice appropriate with likely prognosis of 3 months or less.    See GOC discussions from 3/3 and 3/4--daughter aware of poor prognosis, initially expressed interest for [facility], but now wishes to proceed directly to long term NH placement with hospice.  Also now in agreement with formal transition to comfort-oriented/EOL care.  Discharge planning in progress.    Otherwise continue management as per primary medical team  CMO/MEWS exempt, no blood draws, no ICU or escalation of care (okay to continue limited IV fluids)  MOLST orders updated to DNR/DNI/no PEG, no antibiotics  Continue oxycodone IR 5 mg Q 6 hrs ATC  Discussed with medical team and Dr. Johnson in detail      No additional/acute Palliative Care needs identified at this time  Palliative Care team will sign off.  Please reconsult PRN
hx of hypothyroid on home synthroid 88mcg  COMFORT CARE MEASURES ONLY, MEWS EXEMPT  medications discontinued
As above.  85 yo with stage multiple myeloma, widespread lytic lesions/mets, POD despite Ninlaro, now ECOG 4 with poor performance status, PPS 30%, admitted with hypercalcemia.  She is not a candidate for any further cancer directed treatment.  Patient is hospice appropriate with likely prognosis of 3 months or less.    See GOC discussions from 3/3 and above--daughter aware of poor prognosis, initially expressed interest for [facility], but now wishes to proceed directly to long term NH placement with hospice.  Also now in agreement with formal transition to comfort-oriented/EOL care.      Otherwise continue management as per primary medical team  CMO/MEWS exempt, no blood draws, no ICU or escalation of care (okay to continue limited IV fluids)  MOLST orders updated to DNR/DNI/no PEG, no antibiotics  Continue oxycodone IR 5 mg Q 6 hrs PRN pain, titrate to effectiveness  Discussed with medical team and Dr. Johnson in detail  Palliative Care team will continue to follow.

## 2025-03-05 NOTE — CHART NOTE - NSCHARTNOTEFT_GEN_A_CORE
Call placed to PCP at number listed on chart. Phone kept ringing but did not go to voicemail, unable to leave callback information to pcp.

## 2025-03-05 NOTE — PROGRESS NOTE ADULT - PROBLEM SELECTOR PLAN 2
In setting of stage IV multiple myeloma with widespread lytic lesions  Poor prognostic sign, generally carries prognosis of < 3 months in setting of end stage cancer    Mental status/calcium level slowly improving s/p Zometa x 1 dose and IV hydration    Patient hospice appropriate, see [facility] discussion above, daughter now wishes to proceed with hospice in NH setting.  Remainder of management as per primary medical team.
In setting of stage IV multiple myeloma with widespread lytic lesions  Poor prognostic sign, generally carries prognosis of < 3 months in setting of end stage cancer    Mental status/calcium level slowly improving s/p Zometa x 1 dose and IV hydration    Patient hospice appropriate, see prior GOC discussions, daughter now wishes to proceed with hospice in NH setting.  Remainder of management as per primary medical team.
hx of MM  Follows with Heme onc Dr Colon ([facility]) outpatient   Completed course of Ninlaro ~6 wks ago. Currently on Revlimid.   CT C/A/P - Reidentified expansile lytic lesions involving ribs, spine pelvic bones, proximal femurs. Multiple subacute healing R rib fractures. No acute pathologic fracture. Hypodensities in the left hepatic lobe and spleen. 1.2 cm RLL pulmonary nodule.   Completed course of Ninlaro ~8 wks ago  Currently on Revlimid and prednisone 20mg daily    - c/w prednisone 20mg daily.  - concern for progression of multiple myeloma as pt is reaching hypercalcemia stage  - poor candidate for additional chemotherapy   - pain control.  - heme Onc Dr. Colon following  - Pall following  COMFORT CARE MEASURES ONLY, MEWS EXEMPT
hx of MM  Follows with Heme onc Dr Colon ([facility]) outpatient   Completed course of Ninlaro ~6 wks ago. Currently on Revlimid.   CT C/A/P - Reidentified expansile lytic lesions involving ribs, spine pelvic bones, proximal femurs. Multiple subacute healing R rib fractures. No acute pathologic fracture. Hypodensities in the left hepatic lobe and spleen. 1.2 cm RLL pulmonary nodule.   Completed course of Ninlaro ~8 wks ago  Currently on Revlimid and prednisone 20mg daily    - c/w prednisone 20mg daily.  - concern for progression of multiple myeloma as pt is reaching hypercalcemia stage  - poor candidate for additional chemotherapy   - pain control.  - heme Onc Dr. Colon following  - Pall following

## 2025-03-05 NOTE — PROGRESS NOTE ADULT - SUBJECTIVE AND OBJECTIVE BOX
HPI:  84F, from home, ambulates minimally with walker, PMH Afib on eliquis, multiple myeloma, HTN, hypothyroidism. Presenting after unwitnessed mechanical fall earlier today. Patient a poor historian, collateral obtained from daughter. Per daughter, patient was sitting on bed and attempted to reach for adjacent chair when she fell. Patient was able to call daughter who came with granddaughter to help patient. EMS called for assistance and patient brought to ED. Of note, patient recently recently discharged on Eliquis for Afib on 2/12/25 s/p syncope with hx of frequent falls. On exam, patient more lethargic, however AAOx2 and denied any LOC, dizziness, tongue biting. Patient complaining of pain in b/l flank, denies hitting of head, LOC, seizure, dyspnea chest pain, palpitations, visual changes, prior or after the fall. As well as denied abdominal pain, N/V, fever, chills, cough or other upper respiratory Sx, or urinary Sx.      In ED  VS: T 97.3 / HR 90 / /58 / 95% O2 on RA / RR 18  K 3.3  Corrected Ca 13.9  Cr 1.21 < 0.73 on recent admission  UA mild positive  CTH neg  s/p 1L IVF bolus, 1g CTX, 4mg morphine IBP, 40meq KCL (02 Mar 2025 03:23)     Pt is seen and examined  pt is awake and lying in bed/out of bed to chair  pt seems comfortable and denies any complaints at this time    ROS:  Negative except for:    MEDICATIONS  (STANDING):  lidocaine   4% Patch 1 Patch Transdermal daily  polyethylene glycol 3350 17 Gram(s) Oral daily  predniSONE   Tablet 20 milliGRAM(s) Oral daily  senna 2 Tablet(s) Oral at bedtime    MEDICATIONS  (PRN):  acetaminophen     Tablet .. 650 milliGRAM(s) Oral every 6 hours PRN Temp greater or equal to 38C (100.4F), Mild Pain (1 - 3)  bisacodyl 10 milliGRAM(s) Oral at bedtime PRN Constipation  melatonin 3 milliGRAM(s) Oral at bedtime PRN Insomnia  ondansetron Injectable 4 milliGRAM(s) IV Push every 8 hours PRN Nausea and/or Vomiting  oxyCODONE    IR 5 milliGRAM(s) Oral every 6 hours PRN Moderate to Severe Pain (4 - 10)      Allergies    No Known Allergies    Intolerances        Vital Signs Last 24 Hrs  T(C): 36.3 (05 Mar 2025 05:19), Max: 36.8 (04 Mar 2025 20:43)  T(F): 97.4 (05 Mar 2025 05:19), Max: 98.2 (04 Mar 2025 20:43)  HR: 89 (05 Mar 2025 05:19) (85 - 89)  BP: 118/65 (05 Mar 2025 05:19) (118/65 - 143/74)  BP(mean): --  RR: 18 (05 Mar 2025 05:19) (18 - 18)  SpO2: 98% (05 Mar 2025 05:19) (95% - 98%)    Parameters below as of 05 Mar 2025 05:19  Patient On (Oxygen Delivery Method): room air        PHYSICAL EXAM  General: adult in NAD  HEENT: clear oropharynx, anicteric sclera, pink conjunctiva  Neck: supple  CV: normal S1/S2 with no murmur rubs or gallops  Lungs: positive air movement b/l ant lungs,clear to auscultation, no wheezes, no rales  Abdomen: soft non-tender non-distended, no hepatosplenomegaly  Ext: no clubbing cyanosis or edema  Skin: no rashes and no petechiae  Neuro: alert and oriented X 4, no focal deficits  LABS:                          8.0    3.38  )-----------( 132      ( 04 Mar 2025 06:58 )             28.2         Mean Cell Volume : 90.7 fl  Mean Cell Hemoglobin : 25.7 pg  Mean Cell Hemoglobin Concentration : 28.4 g/dL  Auto Neutrophil # : x  Auto Lymphocyte # : x  Auto Monocyte # : x  Auto Eosinophil # : x  Auto Basophil # : x  Auto Neutrophil % : x  Auto Lymphocyte % : x  Auto Monocyte % : x  Auto Eosinophil % : x  Auto Basophil % : x    Serial CBC  Hematocrit 28.2  Hemoglobin 8.0  Plat 132  RBC 3.11  WBC 3.38  Serial CBC  Hematocrit 27.3  Hemoglobin 7.9  Plat 145  RBC 3.02  WBC 3.20  Serial CBC  Hematocrit 27.6  Hemoglobin 7.8  Plat 134  RBC 3.06  WBC 3.51  Serial CBC  Hematocrit 26.2  Hemoglobin 7.6  Plat 147  RBC 2.91  WBC 4.67    03-04    139  |  113[H]  |  18  ----------------------------<  119[H]  3.4[L]   |  23  |  1.23    Ca    10.0      04 Mar 2025 06:58  Phos  1.7     03-04  Mg     1.9     03-04    TPro  7.9  /  Alb  2.3[L]  /  TBili  0.2  /  DBili  x   /  AST  12  /  ALT  12  /  AlkPhos  94  03-04                    BLOOD SMEAR INTERPRETATION:       RADIOLOGY & ADDITIONAL STUDIES:    
Patient is a 84y old  Female who presents with a chief complaint of Mechanical Fall (03 Mar 2025 15:30)    Subjective:  Patient was seen as follow up.  Patient is still confused. No overnight events.    MEDICATIONS  (STANDING):  apixaban 5 milliGRAM(s) Oral two times a day  levothyroxine 88 MICROGram(s) Oral daily  lidocaine   4% Patch 1 Patch Transdermal daily  pantoprazole    Tablet 40 milliGRAM(s) Oral before breakfast  polyethylene glycol 3350 17 Gram(s) Oral daily  potassium phosphate IVPB 30 milliMole(s) IV Intermittent once  predniSONE   Tablet 20 milliGRAM(s) Oral daily  senna 2 Tablet(s) Oral at bedtime    MEDICATIONS  (PRN):  acetaminophen     Tablet .. 650 milliGRAM(s) Oral every 6 hours PRN Temp greater or equal to 38C (100.4F), Mild Pain (1 - 3)  bisacodyl 10 milliGRAM(s) Oral at bedtime PRN Constipation  melatonin 3 milliGRAM(s) Oral at bedtime PRN Insomnia  ondansetron Injectable 4 milliGRAM(s) IV Push every 8 hours PRN Nausea and/or Vomiting  oxyCODONE    IR 5 milliGRAM(s) Oral every 6 hours PRN Moderate to Severe Pain (4 - 10)      ROS  No fever, sweats, chills  No epistaxis, HA, sore throat  No CP, SOB, cough, sputum  No n/v/d, abd pain, melena, hematochezia  No edema  No rash  No anxiety  No back pain, joint pain  No bleeding, bruising  No dysuria, hematuria    Vital Signs Last 24 Hrs  T(C): 36.5 (04 Mar 2025 05:19), Max: 36.5 (03 Mar 2025 13:39)  T(F): 97.7 (04 Mar 2025 05:19), Max: 97.7 (03 Mar 2025 13:39)  HR: 98 (04 Mar 2025 05:19) (66 - 98)  BP: 128/75 (04 Mar 2025 05:19) (116/68 - 132/73)  BP(mean): --  RR: 17 (04 Mar 2025 05:19) (16 - 17)  SpO2: 94% (04 Mar 2025 05:19) (92% - 97%)    Parameters below as of 04 Mar 2025 05:19  Patient On (Oxygen Delivery Method): room air        PE  NAD  Awake, alert  Anicteric, MMM  RRR  CTAB  Abd soft, NT, ND  No c/c/e  No rash grossly  FROM                          8.0    3.38  )-----------( 132      ( 04 Mar 2025 06:58 )             28.2       03-04    139  |  113[H]  |  18  ----------------------------<  119[H]  3.4[L]   |  23  |  1.23    Ca    10.0      04 Mar 2025 06:58  Phos  1.7     03-04  Mg     1.9     03-04    TPro  7.9  /  Alb  2.3[L]  /  TBili  0.2  /  DBili  x   /  AST  12  /  ALT  12  /  AlkPhos  94  03-04      
  HPI:  84F, from home, ambulates minimally with walker, PMH Afib on eliquis, multiple myeloma, HTN, hypothyroidism. Presenting after unwitnessed mechanical fall earlier today. Patient a poor historian, collateral obtained from daughter. Per daughter, patient was sitting on bed and attempted to reach for adjacent chair when she fell. Patient was able to call daughter who came with granddaughter to help patient. EMS called for assistance and patient brought to ED. Of note, patient recently recently discharged on Eliquis for Afib on 2/12/25 s/p syncope with hx of frequent falls. On exam, patient more lethargic, however AAOx2 and denied any LOC, dizziness, tongue biting. Patient complaining of pain in b/l flank, denies hitting of head, LOC, seizure, dyspnea chest pain, palpitations, visual changes, prior or after the fall. As well as denied abdominal pain, N/V, fever, chills, cough or other upper respiratory Sx, or urinary Sx.      In ED  VS: T 97.3 / HR 90 / /58 / 95% O2 on RA / RR 18  K 3.3  Corrected Ca 13.9  Cr 1.21 < 0.73 on recent admission  UA mild positive  CTH neg  s/p 1L IVF bolus, 1g CTX, 4mg morphine IBP, 40meq KCL (02 Mar 2025 03:23)     Pt is seen and examined  pt is awake and lying in bed/out of bed to chair  pt seems comfortable and denies any complaints at this time    ROS:  Negative except for:    MEDICATIONS  (STANDING):  apixaban 5 milliGRAM(s) Oral two times a day  cefTRIAXone   IVPB 1000 milliGRAM(s) IV Intermittent every 24 hours  levothyroxine 88 MICROGram(s) Oral daily  lidocaine   4% Patch 1 Patch Transdermal daily  pantoprazole    Tablet 40 milliGRAM(s) Oral before breakfast  polyethylene glycol 3350 17 Gram(s) Oral daily  predniSONE   Tablet 20 milliGRAM(s) Oral daily  sodium chloride 0.9%. 1000 milliLiter(s) (100 mL/Hr) IV Continuous <Continuous>    MEDICATIONS  (PRN):  acetaminophen     Tablet .. 650 milliGRAM(s) Oral every 6 hours PRN Temp greater or equal to 38C (100.4F), Mild Pain (1 - 3)  melatonin 3 milliGRAM(s) Oral at bedtime PRN Insomnia  ondansetron Injectable 4 milliGRAM(s) IV Push every 8 hours PRN Nausea and/or Vomiting  oxyCODONE    IR 5 milliGRAM(s) Oral every 8 hours PRN for severe pain      Allergies    No Known Allergies    Intolerances        Vital Signs Last 24 Hrs  T(C): 36.7 (03 Mar 2025 05:49), Max: 36.7 (03 Mar 2025 05:49)  T(F): 98.1 (03 Mar 2025 05:49), Max: 98.1 (03 Mar 2025 05:49)  HR: 83 (03 Mar 2025 05:49) (71 - 89)  BP: 118/81 (03 Mar 2025 05:49) (108/58 - 118/81)  BP(mean): --  RR: 17 (03 Mar 2025 05:49) (17 - 18)  SpO2: 95% (03 Mar 2025 05:49) (95% - 95%)    Parameters below as of 03 Mar 2025 05:49  Patient On (Oxygen Delivery Method): room air        PHYSICAL EXAM  General: adult in NAD  HEENT: clear oropharynx, anicteric sclera, pink conjunctiva  Neck: supple  CV: normal S1/S2 with no murmur rubs or gallops  Lungs: positive air movement b/l ant lungs,clear to auscultation, no wheezes, no rales  Abdomen: soft non-tender non-distended, no hepatosplenomegaly  Ext: no clubbing cyanosis or edema  Skin: no rashes and no petechiae  Neuro: alert and oriented X 4, no focal deficits  LABS:                          7.9    3.20  )-----------( 145      ( 03 Mar 2025 07:45 )             27.3         Mean Cell Volume : 90.4 fl  Mean Cell Hemoglobin : 26.2 pg  Mean Cell Hemoglobin Concentration : 28.9 g/dL  Auto Neutrophil # : x  Auto Lymphocyte # : x  Auto Monocyte # : x  Auto Eosinophil # : x  Auto Basophil # : x  Auto Neutrophil % : x  Auto Lymphocyte % : x  Auto Monocyte % : x  Auto Eosinophil % : x  Auto Basophil % : x    Serial CBC  Hematocrit 27.3  Hemoglobin 7.9  Plat 145  RBC 3.02  WBC 3.20  Serial CBC  Hematocrit 27.6  Hemoglobin 7.8  Plat 134  RBC 3.06  WBC 3.51  Serial CBC  Hematocrit 26.2  Hemoglobin 7.6  Plat 147  RBC 2.91  WBC 4.67    03-03    141  |  113[H]  |  19[H]  ----------------------------<  133[H]  4.6   |  23  |  1.23    Ca    11.2[H]      03 Mar 2025 07:45  Phos  2.3     03-03  Mg     1.8     03-03    TPro  7.7  /  Alb  2.3[L]  /  TBili  0.2  /  DBili  x   /  AST  11  /  ALT  11  /  AlkPhos  88  03-03      PT/INR - ( 01 Mar 2025 21:41 )   PT: 16.3 sec;   INR: 1.41 ratio         PTT - ( 01 Mar 2025 21:41 )  PTT:26.6 sec              BLOOD SMEAR INTERPRETATION:       RADIOLOGY & ADDITIONAL STUDIES:    
PGY-1 Progress Note discussed with attending    PAGER #: [] TILL 5:00 PM  PLEASE CONTACT ON CALL TEAM:  - On Call Team (Please refer to Grover) FROM 5:00 PM - 8:30PM  - Nightfloat Team FROM 8:30 -7:30 AM      INTERVAL HPI/OVERNIGHT EVENTS: No acute overnight events.       REVIEW OF SYSTEMS:  CONSTITUTIONAL: No fever, weight loss, or fatigue  RESPIRATORY: No cough, wheezing, chills or hemoptysis; No shortness of breath  CARDIOVASCULAR: No chest pain, palpitations, dizziness, or leg swelling  GASTROINTESTINAL: No abdominal pain. No nausea, vomiting, or hematemesis; No diarrhea or constipation. No melena or hematochezia.  GENITOURINARY: No dysuria or hematuria, urinary frequency  NEUROLOGICAL: No headaches, memory loss, loss of strength, numbness, or tremors  SKIN: No itching, burning, rashes, or lesions     MEDICATIONS  (STANDING):  apixaban 5 milliGRAM(s) Oral two times a day  levothyroxine 88 MICROGram(s) Oral daily  lidocaine   4% Patch 1 Patch Transdermal daily  pantoprazole    Tablet 40 milliGRAM(s) Oral before breakfast  polyethylene glycol 3350 17 Gram(s) Oral daily  predniSONE   Tablet 20 milliGRAM(s) Oral daily  senna 2 Tablet(s) Oral at bedtime    MEDICATIONS  (PRN):  acetaminophen     Tablet .. 650 milliGRAM(s) Oral every 6 hours PRN Temp greater or equal to 38C (100.4F), Mild Pain (1 - 3)  bisacodyl 10 milliGRAM(s) Oral at bedtime PRN Constipation  melatonin 3 milliGRAM(s) Oral at bedtime PRN Insomnia  ondansetron Injectable 4 milliGRAM(s) IV Push every 8 hours PRN Nausea and/or Vomiting  oxyCODONE    IR 5 milliGRAM(s) Oral every 6 hours PRN Moderate to Severe Pain (4 - 10)      Vital Signs Last 24 Hrs  T(C): 36.5 (04 Mar 2025 05:19), Max: 36.5 (04 Mar 2025 05:19)  T(F): 97.7 (04 Mar 2025 05:19), Max: 97.7 (04 Mar 2025 05:19)  HR: 98 (04 Mar 2025 05:19) (94 - 98)  BP: 128/75 (04 Mar 2025 05:19) (128/75 - 132/73)  BP(mean): --  RR: 17 (04 Mar 2025 05:19) (17 - 17)  SpO2: 94% (04 Mar 2025 05:19) (92% - 94%)    Parameters below as of 04 Mar 2025 05:19  Patient On (Oxygen Delivery Method): room air        PHYSICAL EXAMINATION:  GENERAL: NAD, well built  HEAD:  Atraumatic, Normocephalic  EYES:  conjunctiva and sclera clear  NECK: Supple, No JVD, Normal thyroid  CHEST/LUNG: Clear to auscultation. Clear to percussion bilaterally; No rales, rhonchi, wheezing, or rubs  HEART: Regular rate and rhythm; No murmurs, rubs, or gallops  ABDOMEN: Soft, Nontender, Nondistended; Bowel sounds present, no pain or masses on palpation  NERVOUS SYSTEM:  Alert & Oriented X2  : voiding well  EXTREMITIES:  2+ Peripheral Pulses, No clubbing, cyanosis, or edema  SKIN: warm dry                          8.0    3.38  )-----------( 132      ( 04 Mar 2025 06:58 )             28.2     03-04    139  |  113[H]  |  18  ----------------------------<  119[H]  3.4[L]   |  23  |  1.23    Ca    10.0      04 Mar 2025 06:58  Phos  1.7     03-04  Mg     1.9     03-04    TPro  7.9  /  Alb  2.3[L]  /  TBili  0.2  /  DBili  x   /  AST  12  /  ALT  12  /  AlkPhos  94  03-04    LIVER FUNCTIONS - ( 04 Mar 2025 06:58 )  Alb: 2.3 g/dL / Pro: 7.9 g/dL / ALK PHOS: 94 U/L / ALT: 12 U/L DA / AST: 12 U/L / GGT: x                   I&O's Summary        Urinalysis with Rflx Culture (collected 02 Mar 2025 02:00)        CAPILLARY BLOOD GLUCOSE      RADIOLOGY & ADDITIONAL TESTS:                  
PGY-1 Progress Note discussed with attending    PAGER #: [] TILL 5:00 PM  PLEASE CONTACT ON CALL TEAM:  - On Call Team (Please refer to Grover) FROM 5:00 PM - 8:30PM  - Nightfloat Team FROM 8:30 -7:30 AM      INTERVAL HPI/OVERNIGHT EVENTS: Pt appears comfortable on exam. C/o intermittent SOB. Mental status waxes and wanes AAox1-2.       REVIEW OF SYSTEMS:  CONSTITUTIONAL: No fever, weight loss, or fatigue  RESPIRATORY: No cough, wheezing, chills or hemoptysis; + shortness of breath  CARDIOVASCULAR: No chest pain, palpitations, dizziness, or leg swelling  GASTROINTESTINAL: No abdominal pain. No nausea, vomiting, or hematemesis; No diarrhea or constipation. No melena or hematochezia.  GENITOURINARY: No dysuria or hematuria, urinary frequency  NEUROLOGICAL: No headaches, memory loss, loss of strength, numbness, or tremors  SKIN: No itching, burning, rashes, or lesions     MEDICATIONS  (STANDING):  apixaban 5 milliGRAM(s) Oral two times a day  cefTRIAXone   IVPB 1000 milliGRAM(s) IV Intermittent every 24 hours  levothyroxine 88 MICROGram(s) Oral daily  lidocaine   4% Patch 1 Patch Transdermal daily  pantoprazole    Tablet 40 milliGRAM(s) Oral before breakfast  polyethylene glycol 3350 17 Gram(s) Oral daily  predniSONE   Tablet 20 milliGRAM(s) Oral daily    MEDICATIONS  (PRN):  acetaminophen     Tablet .. 650 milliGRAM(s) Oral every 6 hours PRN Temp greater or equal to 38C (100.4F), Mild Pain (1 - 3)  melatonin 3 milliGRAM(s) Oral at bedtime PRN Insomnia  ondansetron Injectable 4 milliGRAM(s) IV Push every 8 hours PRN Nausea and/or Vomiting  oxyCODONE    IR 5 milliGRAM(s) Oral every 8 hours PRN for severe pain      Vital Signs Last 24 Hrs  T(C): 36.5 (03 Mar 2025 13:39), Max: 36.7 (03 Mar 2025 05:49)  T(F): 97.7 (03 Mar 2025 13:39), Max: 98.1 (03 Mar 2025 05:49)  HR: 91 (03 Mar 2025 13:39) (66 - 91)  BP: 116/68 (03 Mar 2025 13:39) (108/58 - 118/81)  BP(mean): --  RR: 16 (03 Mar 2025 13:39) (16 - 18)  SpO2: 97% (03 Mar 2025 13:39) (95% - 97%)    Parameters below as of 03 Mar 2025 13:39  Patient On (Oxygen Delivery Method): room air        PHYSICAL EXAMINATION:  GENERAL: NAD; Lethargic, lying in bed  HEAD:  Atraumatic, Normocephalic  EYES: EOMI, PERRLA, conjunctiva and sclera clear  ENMT: No tonsillar erythema, exudates, or enlargement; Dry mucous membranes  NECK: Supple, normal appearance, No JVD;  NERVOUS SYSTEM:  Alert & Oriented X2,  Motor Strength 4/5 B/L upper and lower extremities, sensation intact  CHEST/LUNG: Lungs clear to auscultation bilaterally, No rales, rhonchi, wheezing   HEART: Irregular rate and rhythm; No murmurs, rubs, or gallops  ABDOMEN: Soft, Nontender, Nondistended; Bowel sounds present  EXTREMITIES:  2+ Peripheral Pulses, No clubbing, cyanosis, or edema                        7.9    3.20  )-----------( 145      ( 03 Mar 2025 07:45 )             27.3     03-03    141  |  113[H]  |  19[H]  ----------------------------<  133[H]  4.6   |  23  |  1.23    Ca    11.2[H]      03 Mar 2025 07:45  Phos  2.3     03-03  Mg     1.8     03-03    TPro  7.7  /  Alb  2.3[L]  /  TBili  0.2  /  DBili  x   /  AST  11  /  ALT  11  /  AlkPhos  88  03-03    LIVER FUNCTIONS - ( 03 Mar 2025 07:45 )  Alb: 2.3 g/dL / Pro: 7.7 g/dL / ALK PHOS: 88 U/L / ALT: 11 U/L DA / AST: 11 U/L / GGT: x               PT/INR - ( 01 Mar 2025 21:41 )   PT: 16.3 sec;   INR: 1.41 ratio         PTT - ( 01 Mar 2025 21:41 )  PTT:26.6 sec    I&O's Summary    02 Mar 2025 07:01  -  03 Mar 2025 07:00  --------------------------------------------------------  IN: 0 mL / OUT: 1100 mL / NET: -1100 mL          Urinalysis with Rflx Culture (collected 02 Mar 2025 02:00)        CAPILLARY BLOOD GLUCOSE      RADIOLOGY & ADDITIONAL TESTS:                  
follow up on:  complex medical decision making related to goals of care    [facility] and Palliative Consult Service:  Carmen Osuna DO: cell ([phone])  Reggie Sharma MD: cell ([phone])  Adrián Jones NP: cell ([phone])   Jessica Fleischer-Black MD:  cell ([phone])  Kevin Kelley SW: [place] ([phone])     Can contact any Palliative Team member via Microsoft Teams for consults and questions      OVERNIGHT EVENTS:  Received PRN oxycodone IR 5 mg x 2 on 3/3, and x 1 so far today.  Otherwise none.      Patient examined earlier this morning with daughter at bedside.  Family refused use of [place] speaking , with daughter providing translation.  Alert and oriented x 2.  Somewhat more awake today, but still with significant fatigue, speaking more, falls asleep easily.  Presently complains of 7/10 back pain, also reports abdominal discomfort.  Denies chest pain, SOB, nausea, or vomiting.  + multiple BM today (received mineral oil enema x 1 yesterday with good effect).   Overall appears comfortable.  No other acute complaints.      Additional Heme/Onc note (earlier today) from Dr. Colon read and appreciated--patient not a candidate for further treatment, hospice appropriate, daughter reportedly aware and in agreement.    Present Symptoms: Mild, Moderate, Severe  Pain:  moderate             Location -    back/spine                          Aggravating factors -             Quality -  dullness             Radiation -  denies             Timing-  intermittent             Severity (0-10 scale):  7/10             Minimal acceptable level (0-10 scale):  0/10  Fatigue:  severe  Nausea:  denies  Lack of Appetite:  denies  SOB:  denies  Constipation:  denies      Review of Systems: All other systems reviewed and are negative      MEDICATIONS  (STANDING):  lidocaine   4% Patch 1 Patch Transdermal daily  polyethylene glycol 3350 17 Gram(s) Oral daily  predniSONE   Tablet 20 milliGRAM(s) Oral daily  senna 2 Tablet(s) Oral at bedtime    MEDICATIONS  (PRN):  acetaminophen     Tablet .. 650 milliGRAM(s) Oral every 6 hours PRN Temp greater or equal to 38C (100.4F), Mild Pain (1 - 3)  bisacodyl 10 milliGRAM(s) Oral at bedtime PRN Constipation  melatonin 3 milliGRAM(s) Oral at bedtime PRN Insomnia  ondansetron Injectable 4 milliGRAM(s) IV Push every 8 hours PRN Nausea and/or Vomiting  oxyCODONE    IR 5 milliGRAM(s) Oral every 6 hours PRN Moderate to Severe Pain (4 - 10)      PHYSICAL EXAM:  Vital Signs Last 24 Hrs  T(C): 36.8 (04 Mar 2025 20:43), Max: 36.8 (04 Mar 2025 20:43)  T(F): 98.2 (04 Mar 2025 20:43), Max: 98.2 (04 Mar 2025 20:43)  HR: 89 (04 Mar 2025 20:43) (85 - 98)  BP: 143/74 (04 Mar 2025 20:43) (119/70 - 143/74)  BP(mean): --  RR: 18 (04 Mar 2025 20:43) (17 - 18)  SpO2: 98% (04 Mar 2025 20:43) (94% - 98%)    Parameters below as of 04 Mar 2025 20:43  Patient On (Oxygen Delivery Method): room air        General: alert  oriented x _2___    lethargic, slightly more verbally responsive, in NAD    Palliative Performance Scale/Karnofsky Score:  30%  http://npcrc.org/files/news/palliative_performance_scale_ppsv2.pdf    HEENT:  EOMI anicteric, pharynx clear, MM moist  Lungs:  overall clear to auscultation, respirations unlabored, no congestion noted  CV: irreg irreg (a fib), normal S1 and S2, no murmur  GI: soft non distended non tender   + normal bowel sounds  : incontinent    Musculoskeletal: weakness x4  in all extremities, ambulatory with assistance but now mostly bedbound, mild LE edema noted  Skin: no abnormal skin lesions or DU noted  Neuro: no focal deficits  Oral intake ability:  full capability, on regular diet      LABS:                          8.0    3.38  )-----------( 132      ( 04 Mar 2025 06:58 )             28.2     03-04    139  |  113[H]  |  18  ----------------------------<  119[H]  3.4[L]   |  23  |  1.23    Ca    10.0      04 Mar 2025 06:58  Phos  1.7     03-04  Mg     1.9     03-04    TPro  7.9  /  Alb  2.3[L]  /  TBili  0.2  /  DBili  x   /  AST  12  /  ALT  12  /  AlkPhos  94  03-04    Urinalysis Basic - ( 04 Mar 2025 06:58 )    Color: x / Appearance: x / SG: x / pH: x  Gluc: 119 mg/dL / Ketone: x  / Bili: x / Urobili: x   Blood: x / Protein: x / Nitrite: x   Leuk Esterase: x / RBC: x / WBC x   Sq Epi: x / Non Sq Epi: x / Bacteria: x        RADIOLOGY & ADDITIONAL STUDIES:
follow up on:  complex medical decision making related to goals of care    [facility] Geriatric and Palliative Consult Service:  Carmen Osuna DO: cell ([phone])  Reggie Sharma MD: cell ([phone])  Adrián Jones NP: cell ([phone])   Jessica Fleischer-Black MD:  cell ([phone])  Kevin Kelley SW: cell ([phone])     Can contact any Palliative Team member via [employer] Teams for consults and questions      OVERNIGHT EVENTS:  Received PRN oxycodone IR x 1 on 3/4, now changed to oxy IR 5 mg Q 6 hrs ATC per daughter's request.  Otherwise none.  Discharge planning in process for D/C to LTC with hospice, reportedly accepted to [facility].      Patient examined earlier this morning with daughter at bedside.  Alert and oriented x 2, much more awake and responsive.  Denies pain or SOB acutely (just got another dose of oxycodone).  Denies nausea, vomiting, or diarrhea.  + BM.  Patient aware of plan for transfer to NH with hospice, voiced agreement.  No other acute complaints.      Present Symptoms: Mild, Moderate, Severe  Pain:  Denies presently, 0/10  Fatigue:  moderate (fatigue + weakness)  Nausea:  denies  Lack of Appetite:  mild to moderate  SOB:  denies  Depression:  denies  Constipation:  denies      Review of Systems: All other systems reviewed and are negative       MEDICATIONS  (STANDING):  lidocaine   4% Patch 1 Patch Transdermal daily  oxyCODONE    IR 5 milliGRAM(s) Oral every 6 hours  polyethylene glycol 3350 17 Gram(s) Oral daily  predniSONE   Tablet 20 milliGRAM(s) Oral daily  senna 2 Tablet(s) Oral at bedtime    MEDICATIONS  (PRN):  acetaminophen     Tablet .. 650 milliGRAM(s) Oral every 6 hours PRN Temp greater or equal to 38C (100.4F), Mild Pain (1 - 3)  bisacodyl 10 milliGRAM(s) Oral at bedtime PRN Constipation  melatonin 3 milliGRAM(s) Oral at bedtime PRN Insomnia  ondansetron Injectable 4 milliGRAM(s) IV Push every 8 hours PRN Nausea and/or Vomiting      PHYSICAL EXAM:  Vital Signs Last 24 Hrs  T(C): 36.4 (05 Mar 2025 12:36), Max: 36.8 (04 Mar 2025 20:43)  T(F): 97.6 (05 Mar 2025 12:36), Max: 98.2 (04 Mar 2025 20:43)  HR: 93 (05 Mar 2025 12:36) (89 - 93)  BP: 155/84 (05 Mar 2025 12:36) (118/65 - 155/84)  BP(mean): --  RR: 18 (05 Mar 2025 12:36) (18 - 18)  SpO2: 99% (05 Mar 2025 12:36) (98% - 99%)    Parameters below as of 05 Mar 2025 12:36  Patient On (Oxygen Delivery Method): room air        General:  alert  oriented x __2__   still appears tired and fatigued but overall much more responsive, in NAD    Palliative Performance Scale/Karnofsky Score:  30%  http://[email or web address]org/files/news/[email or web address]_performance_scale_[email or web address]    HEENT:  EOMI anicteric, pharynx clear, MM moist  Lungs:  overall clear to auscultation, respirations unlabored, no congestion noted  CV: irreg irreg (a fib), normal S1 and S2, no murmur  GI: soft non distended non tender   + normal bowel sounds  : incontinent    Musculoskeletal: weakness x4  in all extremities, ambulatory with assistance but now mostly bedbound, mild LE edema noted  Skin: no abnormal skin lesions or DU noted  Neuro: no focal deficits  Oral intake ability:  full capability, on regular diet        LABS:                          8.0    3.38  )-----------( 132      ( 04 Mar 2025 06:58 )             28.2     03-04    139  |  113[H]  |  18  ----------------------------<  119[H]  3.4[L]   |  23  |  1.23    Ca    10.0      04 Mar 2025 06:58  Phos  1.7     03-04  Mg     1.9     03-04    TPro  7.9  /  Alb  2.3[L]  /  TBili  0.2  /  DBili  x   /  AST  12  /  ALT  12  /  AlkPhos  94  03-04    Urinalysis Basic - ( 04 Mar 2025 06:58 )    Color: x / Appearance: x / SG: x / pH: x  Gluc: 119 mg/dL / Ketone: x  / Bili: x / Urobili: x   Blood: x / Protein: x / Nitrite: x   Leuk Esterase: x / RBC: x / WBC x   Sq Epi: x / Non Sq Epi: x / Bacteria: x        RADIOLOGY & ADDITIONAL STUDIES:

## (undated) DEVICE — SOLIDIFIER ISOLYZER 2000 CC

## (undated) DEVICE — VENODYNE/SCD SLEEVE CALF MEDIUM

## (undated) DEVICE — KIT ENDO PROCEDURE CUST W/VLV

## (undated) DEVICE — TUBING CANNULA SALTER LABS NASAL ADULT 7FT

## (undated) DEVICE — FORMALIN CUPS 10% BUFFERED

## (undated) DEVICE — SOL INJ NS 0.9% 500ML 1-PORT

## (undated) DEVICE — FORCEP RADIAL JAW 4 W NDL 2.2MM 2.8MM 240CM ORANGE DISP

## (undated) DEVICE — VALVE ENDO SURESEAL II 0-5FR

## (undated) DEVICE — BITE BLOCK ADULT 20 X 27MM (GREEN)

## (undated) DEVICE — MASK LRG MED AND HIGH O2 CONC M TO M 10FT